# Patient Record
Sex: MALE | Race: WHITE | NOT HISPANIC OR LATINO | URBAN - METROPOLITAN AREA
[De-identification: names, ages, dates, MRNs, and addresses within clinical notes are randomized per-mention and may not be internally consistent; named-entity substitution may affect disease eponyms.]

---

## 2017-01-12 DIAGNOSIS — R06.2 WHEEZING: Primary | ICD-10-CM

## 2017-01-12 DIAGNOSIS — E84.0 CYSTIC FIBROSIS WITH PULMONARY MANIFESTATIONS (H): ICD-10-CM

## 2017-01-12 RX ORDER — ALBUTEROL SULFATE 90 UG/1
2 AEROSOL, METERED RESPIRATORY (INHALATION) EVERY 6 HOURS PRN
Qty: 1 INHALER | Refills: 12 | Status: SHIPPED | OUTPATIENT
Start: 2017-01-12 | End: 2017-11-28

## 2017-03-23 DIAGNOSIS — E84.9 CF (CYSTIC FIBROSIS) (H): Primary | ICD-10-CM

## 2017-03-24 ENCOUNTER — TELEPHONE (OUTPATIENT)
Dept: PULMONOLOGY | Facility: CLINIC | Age: 37
End: 2017-03-24

## 2017-03-24 NOTE — TELEPHONE ENCOUNTER
Prior Authorization Retail Medication Request  Medication/Dose: Symbicort 80-4.5MCG/ACT  Diagnosis and ICD code: E84.0  New/Renewal/Insurance Change PA: renewal  Previously Tried and Failed Therapies:     Insurance ID (if provided): [id number]  Insurance Phone (if provided): [employer]    Covermymeds Keycode: [id number]     Any additional info from fax request:     If you received a fax notification from an outside Pharmacy:  Pharmacy Name:  Pharmacy #:  Pharmacy Fax:

## 2017-04-03 NOTE — TELEPHONE ENCOUNTER
Prior Authorization Approval    Authorization Effective Date: 3/23/2017  Authorization Expiration Date: 3/23/2018  Medication: Symbicort 80-4.5MCG/ACT--APPROVED  Approved Dose/Quantity: 3 per 90 days  Reference #:     Insurance Company: [employer] - Phone [phone] Fax [phone]  Expected CoPay:       CoPay Card Available:      Foundation Assistance Needed:    Which Pharmacy is filling the prescription (Not needed for infusion/clinic administered): [facility] [address] [place] [address] [address]  Pharmacy Notified:  yes  Patient Notified:  Yes

## 2017-11-22 ASSESSMENT — ACTIVITIES OF DAILY LIVING (ADL)
ARE_THERE_FIREARMS_IN_YOUR_HOME?: N
ARE_THERE_CARBON_MONOXIDE_DETECTORS_IN_YOUR_HOME?: Y
DO_MEMBERS_OF_YOUR_HOUSEHOLD_WEAR_SEAT_BELTS?: Y
ARE_THERE_SMOKE_DETECTORS_IN_YOUR_HOME?: Y
DO_MEMBERS_OF_YOUR_HOUSEHOLD_USE_SUNSCREEN?: Y
DO_MEMBERS_OF_YOUR_HOUSEHOLD_USE_SAFETY_HELMETS?: Y

## 2017-11-28 ENCOUNTER — INFUSION THERAPY VISIT (OUTPATIENT)
Dept: INFUSION THERAPY | Facility: CLINIC | Age: 37
End: 2017-11-28
Attending: INTERNAL MEDICINE
Payer: COMMERCIAL

## 2017-11-28 ENCOUNTER — OFFICE VISIT (OUTPATIENT)
Dept: PULMONOLOGY | Facility: CLINIC | Age: 37
End: 2017-11-28
Attending: INTERNAL MEDICINE
Payer: COMMERCIAL

## 2017-11-28 ENCOUNTER — ALLIED HEALTH/NURSE VISIT (OUTPATIENT)
Dept: CARE COORDINATION | Facility: CLINIC | Age: 37
End: 2017-11-28

## 2017-11-28 VITALS
RESPIRATION RATE: 18 BRPM | HEIGHT: 68 IN | HEART RATE: 56 BPM | OXYGEN SATURATION: 98 % | BODY MASS INDEX: 29.4 KG/M2 | DIASTOLIC BLOOD PRESSURE: 75 MMHG | WEIGHT: 194 LBS | SYSTOLIC BLOOD PRESSURE: 127 MMHG

## 2017-11-28 VITALS
RESPIRATION RATE: 16 BRPM | HEART RATE: 62 BPM | SYSTOLIC BLOOD PRESSURE: 121 MMHG | WEIGHT: 193.12 LBS | DIASTOLIC BLOOD PRESSURE: 64 MMHG | TEMPERATURE: 98 F | BODY MASS INDEX: 29.36 KG/M2

## 2017-11-28 DIAGNOSIS — J45.909 ASTHMA: ICD-10-CM

## 2017-11-28 DIAGNOSIS — R06.2 WHEEZING: ICD-10-CM

## 2017-11-28 DIAGNOSIS — E84.0 CYSTIC FIBROSIS WITH PULMONARY MANIFESTATIONS (H): ICD-10-CM

## 2017-11-28 DIAGNOSIS — E84.9 CF (CYSTIC FIBROSIS) (H): ICD-10-CM

## 2017-11-28 DIAGNOSIS — E84.9 CF (CYSTIC FIBROSIS) (H): Primary | ICD-10-CM

## 2017-11-28 DIAGNOSIS — Z13.9 RISK AND FUNCTIONAL ASSESSMENT: Primary | ICD-10-CM

## 2017-11-28 DIAGNOSIS — E84.0 CYSTIC FIBROSIS WITH PULMONARY MANIFESTATIONS (H): Primary | ICD-10-CM

## 2017-11-28 LAB
ALBUMIN SERPL-MCNC: 4 G/DL (ref 3.4–5)
ALBUMIN UR-MCNC: NEGATIVE MG/DL
ALP SERPL-CCNC: 64 U/L (ref 40–150)
ANION GAP SERPL CALCULATED.3IONS-SCNC: 8 MMOL/L (ref 3–14)
APPEARANCE UR: CLEAR
AST SERPL W P-5'-P-CCNC: 24 U/L (ref 0–45)
BASOPHILS # BLD AUTO: 0 10E9/L (ref 0–0.2)
BASOPHILS NFR BLD AUTO: 0.7 %
BILIRUB UR QL STRIP: NEGATIVE
BUN SERPL-MCNC: 14 MG/DL (ref 7–30)
CALCIUM SERPL-MCNC: 8.9 MG/DL (ref 8.5–10.1)
CHLORIDE SERPL-SCNC: 106 MMOL/L (ref 94–109)
CHOLEST SERPL-MCNC: 190 MG/DL
CO2 SERPL-SCNC: 25 MMOL/L (ref 20–32)
COLOR UR AUTO: YELLOW
CREAT SERPL-MCNC: 0.87 MG/DL (ref 0.66–1.25)
CREAT UR-MCNC: 129 MG/DL
DEPRECATED CALCIDIOL+CALCIFEROL SERPL-MC: 18 UG/L (ref 20–75)
DIFFERENTIAL METHOD BLD: NORMAL
EOSINOPHIL # BLD AUTO: 0.2 10E9/L (ref 0–0.7)
EOSINOPHIL NFR BLD AUTO: 2.9 %
ERYTHROCYTE [DISTWIDTH] IN BLOOD BY AUTOMATED COUNT: 12.1 % (ref 10–15)
ERYTHROCYTE [SEDIMENTATION RATE] IN BLOOD BY WESTERGREN METHOD: 4 MM/H (ref 0–15)
EXPTIME-PRE: 7.48 SEC
FEF2575-%PRED-PRE: 71 %
FEF2575-PRE: 2.96 L/SEC
FEF2575-PRED: 4.15 L/SEC
FEFMAX-%PRED-PRE: 108 %
FEFMAX-PRE: 10.72 L/SEC
FEFMAX-PRED: 9.85 L/SEC
FEV1-%PRED-PRE: 88 %
FEV1-PRE: 3.64 L
FEV1FEV6-PRE: 78 %
FEV1FEV6-PRED: 83 %
FEV1FVC-PRE: 76 %
FEV1FVC-PRED: 82 %
FIFMAX-PRE: 8.62 L/SEC
FVC-%PRED-PRE: 95 %
FVC-PRE: 4.8 L
FVC-PRED: 5.03 L
GFR SERPL CREATININE-BSD FRML MDRD: >90 ML/MIN/1.7M2
GGT SERPL-CCNC: 18 U/L (ref 0–75)
GLUCOSE BLDC GLUCOMTR-MCNC: 95 MG/DL (ref 70–99)
GLUCOSE SERPL-MCNC: 112 MG/DL (ref 70–99)
GLUCOSE SERPL-MCNC: 140 MG/DL (ref 70–99)
GLUCOSE SERPL-MCNC: 86 MG/DL (ref 70–99)
GLUCOSE SERPL-MCNC: 88 MG/DL (ref 70–99)
GLUCOSE SERPL-MCNC: 95 MG/DL (ref 70–99)
GLUCOSE SERPL-MCNC: 97 MG/DL (ref 70–99)
GLUCOSE UR STRIP-MCNC: NEGATIVE MG/DL
HBA1C MFR BLD: 4.9 % (ref 4.3–6)
HCT VFR BLD AUTO: 44.1 % (ref 40–53)
HDLC SERPL-MCNC: 53 MG/DL
HGB BLD-MCNC: 15.2 G/DL (ref 13.3–17.7)
HGB UR QL STRIP: NEGATIVE
IGA SERPL-MCNC: 274 MG/DL (ref 70–380)
IGG SERPL-MCNC: 912 MG/DL (ref 695–1620)
IGM SERPL-MCNC: 53 MG/DL (ref 60–265)
IMM GRANULOCYTES # BLD: 0 10E9/L (ref 0–0.4)
IMM GRANULOCYTES NFR BLD: 0.2 %
INR PPP: 1.12 (ref 0.86–1.14)
INSULIN SERPL-ACNC: 20.4 MU/L (ref 3–25)
INSULIN SERPL-ACNC: 20.7 MU/L (ref 3–25)
INSULIN SERPL-ACNC: 24.6 MU/L (ref 3–25)
INSULIN SERPL-ACNC: 34.9 MU/L (ref 3–25)
INSULIN SERPL-ACNC: 5.6 MU/L (ref 3–25)
IRON SERPL-MCNC: 49 UG/DL (ref 35–180)
KETONES UR STRIP-MCNC: NEGATIVE MG/DL
LDLC SERPL CALC-MCNC: 107 MG/DL
LEUKOCYTE ESTERASE UR QL STRIP: NEGATIVE
LYMPHOCYTES # BLD AUTO: 2.6 10E9/L (ref 0.8–5.3)
LYMPHOCYTES NFR BLD AUTO: 45.8 %
MAGNESIUM SERPL-MCNC: 2 MG/DL (ref 1.6–2.3)
MCH RBC QN AUTO: 30.6 PG (ref 26.5–33)
MCHC RBC AUTO-ENTMCNC: 34.5 G/DL (ref 31.5–36.5)
MCV RBC AUTO: 89 FL (ref 78–100)
MICROALBUMIN UR-MCNC: <5 MG/L
MICROALBUMIN/CREAT UR: NORMAL MG/G CR (ref 0–17)
MONOCYTES # BLD AUTO: 0.4 10E9/L (ref 0–1.3)
MONOCYTES NFR BLD AUTO: 7.3 %
MUCOUS THREADS #/AREA URNS LPF: PRESENT /LPF
NEUTROPHILS # BLD AUTO: 2.4 10E9/L (ref 1.6–8.3)
NEUTROPHILS NFR BLD AUTO: 43.1 %
NITRATE UR QL: NEGATIVE
NONHDLC SERPL-MCNC: 136 MG/DL
NRBC # BLD AUTO: 0 10*3/UL
NRBC BLD AUTO-RTO: 0 /100
PH UR STRIP: 5 PH (ref 5–7)
PHOSPHATE SERPL-MCNC: 3 MG/DL (ref 2.5–4.5)
PLATELET # BLD AUTO: 238 10E9/L (ref 150–450)
POTASSIUM SERPL-SCNC: 3.8 MMOL/L (ref 3.4–5.3)
PROT SERPL-MCNC: 7.1 G/DL (ref 6.8–8.8)
RBC # BLD AUTO: 4.97 10E12/L (ref 4.4–5.9)
RBC #/AREA URNS AUTO: 0 /HPF (ref 0–2)
SODIUM SERPL-SCNC: 140 MMOL/L (ref 133–144)
SOURCE: ABNORMAL
SP GR UR STRIP: 1.01 (ref 1–1.03)
TRIGL SERPL-MCNC: 144 MG/DL
TSH SERPL DL<=0.005 MIU/L-ACNC: 1.82 MU/L (ref 0.4–4)
UROBILINOGEN UR STRIP-MCNC: 0 MG/DL (ref 0–2)
WBC # BLD AUTO: 5.6 10E9/L (ref 4–11)
WBC #/AREA URNS AUTO: 1 /HPF (ref 0–2)

## 2017-11-28 PROCEDURE — 85025 COMPLETE CBC W/AUTO DIFF WBC: CPT | Performed by: INTERNAL MEDICINE

## 2017-11-28 PROCEDURE — 82784 ASSAY IGA/IGD/IGG/IGM EACH: CPT | Performed by: INTERNAL MEDICINE

## 2017-11-28 PROCEDURE — 82962 GLUCOSE BLOOD TEST: CPT

## 2017-11-28 PROCEDURE — 82947 ASSAY GLUCOSE BLOOD QUANT: CPT | Mod: 91 | Performed by: INTERNAL MEDICINE

## 2017-11-28 PROCEDURE — 82785 ASSAY OF IGE: CPT | Performed by: INTERNAL MEDICINE

## 2017-11-28 PROCEDURE — 83540 ASSAY OF IRON: CPT | Performed by: INTERNAL MEDICINE

## 2017-11-28 PROCEDURE — 84446 ASSAY OF VITAMIN E: CPT | Performed by: INTERNAL MEDICINE

## 2017-11-28 PROCEDURE — 84450 TRANSFERASE (AST) (SGOT): CPT | Performed by: INTERNAL MEDICINE

## 2017-11-28 PROCEDURE — 83735 ASSAY OF MAGNESIUM: CPT | Performed by: INTERNAL MEDICINE

## 2017-11-28 PROCEDURE — 84443 ASSAY THYROID STIM HORMONE: CPT | Performed by: INTERNAL MEDICINE

## 2017-11-28 PROCEDURE — 87070 CULTURE OTHR SPECIMN AEROBIC: CPT | Performed by: INTERNAL MEDICINE

## 2017-11-28 PROCEDURE — 84403 ASSAY OF TOTAL TESTOSTERONE: CPT | Performed by: INTERNAL MEDICINE

## 2017-11-28 PROCEDURE — 84155 ASSAY OF PROTEIN SERUM: CPT | Performed by: INTERNAL MEDICINE

## 2017-11-28 PROCEDURE — 84075 ASSAY ALKALINE PHOSPHATASE: CPT | Performed by: INTERNAL MEDICINE

## 2017-11-28 PROCEDURE — 85610 PROTHROMBIN TIME: CPT | Performed by: INTERNAL MEDICINE

## 2017-11-28 PROCEDURE — 82306 VITAMIN D 25 HYDROXY: CPT | Performed by: INTERNAL MEDICINE

## 2017-11-28 PROCEDURE — 80069 RENAL FUNCTION PANEL: CPT | Performed by: INTERNAL MEDICINE

## 2017-11-28 PROCEDURE — 83036 HEMOGLOBIN GLYCOSYLATED A1C: CPT | Performed by: INTERNAL MEDICINE

## 2017-11-28 PROCEDURE — 82043 UR ALBUMIN QUANTITATIVE: CPT | Performed by: INTERNAL MEDICINE

## 2017-11-28 PROCEDURE — 83525 ASSAY OF INSULIN: CPT | Performed by: INTERNAL MEDICINE

## 2017-11-28 PROCEDURE — 81001 URINALYSIS AUTO W/SCOPE: CPT | Performed by: INTERNAL MEDICINE

## 2017-11-28 PROCEDURE — 99212 OFFICE O/P EST SF 10 MIN: CPT | Mod: ZF

## 2017-11-28 PROCEDURE — 25000125 ZZHC RX 250: Mod: ZF | Performed by: INTERNAL MEDICINE

## 2017-11-28 PROCEDURE — 97803 MED NUTRITION INDIV SUBSEQ: CPT | Mod: 59

## 2017-11-28 PROCEDURE — 84590 ASSAY OF VITAMIN A: CPT | Performed by: INTERNAL MEDICINE

## 2017-11-28 PROCEDURE — 80061 LIPID PANEL: CPT | Performed by: INTERNAL MEDICINE

## 2017-11-28 PROCEDURE — 85652 RBC SED RATE AUTOMATED: CPT | Performed by: INTERNAL MEDICINE

## 2017-11-28 PROCEDURE — 82977 ASSAY OF GGT: CPT | Performed by: INTERNAL MEDICINE

## 2017-11-28 RX ORDER — ALBUTEROL SULFATE 90 UG/1
2 AEROSOL, METERED RESPIRATORY (INHALATION) EVERY 6 HOURS PRN
Qty: 1 INHALER | Refills: 11 | Status: SHIPPED | OUTPATIENT
Start: 2017-11-28 | End: 2019-03-12

## 2017-11-28 RX ORDER — BUDESONIDE AND FORMOTEROL FUMARATE DIHYDRATE 80; 4.5 UG/1; UG/1
2 AEROSOL RESPIRATORY (INHALATION) 2 TIMES DAILY
COMMUNITY
End: 2017-11-28

## 2017-11-28 RX ORDER — BUDESONIDE AND FORMOTEROL FUMARATE DIHYDRATE 80; 4.5 UG/1; UG/1
2 AEROSOL RESPIRATORY (INHALATION) 2 TIMES DAILY
Qty: 1 INHALER | Refills: 11 | Status: SHIPPED | OUTPATIENT
Start: 2017-11-28 | End: 2018-04-23

## 2017-11-28 RX ADMIN — ALCOHOL 75 G: 65 GEL TOPICAL at 07:29

## 2017-11-28 ASSESSMENT — ENCOUNTER SYMPTOMS
SPEECH CHANGE: 0
PARALYSIS: 0
CLAUDICATION: 0
DIFFICULTY URINATING: 0
SINUS CONGESTION: 0
ARTHRALGIAS: 0
WHEEZING: 0
EYE PAIN: 0
NUMBNESS: 0
DYSPNEA ON EXERTION: 0
DIZZINESS: 0
TROUBLE SWALLOWING: 0
ABDOMINAL PAIN: 0
DEPRESSION: 0
COUGH DISTURBING SLEEP: 0
LOSS OF CONSCIOUSNESS: 0
SLEEP DISTURBANCES DUE TO BREATHING: 0
SHORTNESS OF BREATH: 0
MUSCLE WEAKNESS: 0
TREMORS: 0
HYPOTENSION: 0
RECTAL PAIN: 0
DECREASED APPETITE: 0
JOINT SWELLING: 0
TASTE DISTURBANCE: 0
EXTREMITY NUMBNESS: 0
HEARTBURN: 0
LIGHT-HEADEDNESS: 0
HOARSE VOICE: 0
ORTHOPNEA: 0
HALLUCINATIONS: 0
HEADACHES: 0
SINUS PAIN: 0
DECREASED CONCENTRATION: 0
STIFFNESS: 0
EXERCISE INTOLERANCE: 0
PANIC: 0
MYALGIAS: 0
VOMITING: 0
FLANK PAIN: 0
LEG PAIN: 0
ALTERED TEMPERATURE REGULATION: 0
POOR WOUND HEALING: 0
SYNCOPE: 0
NIGHT SWEATS: 0
EYE IRRITATION: 0
DOUBLE VISION: 0
LEG SWELLING: 0
NECK PAIN: 0
POLYDIPSIA: 0
RESPIRATORY PAIN: 0
BLOOD IN STOOL: 0
BOWEL INCONTINENCE: 0
SWOLLEN GLANDS: 0
DYSURIA: 0
HEMOPTYSIS: 0
TINGLING: 0
BLOATING: 0
INSOMNIA: 0
SNORES LOUDLY: 0
SORE THROAT: 0
COUGH: 0
BRUISES/BLEEDS EASILY: 0
WEIGHT LOSS: 0
DISTURBANCES IN COORDINATION: 0
SMELL DISTURBANCE: 0
NECK MASS: 0
POSTURAL DYSPNEA: 0
TACHYCARDIA: 0
RECTAL BLEEDING: 0
HEMATURIA: 0
FATIGUE: 0
WEAKNESS: 0
MUSCLE CRAMPS: 0
CONSTIPATION: 0
PALPITATIONS: 0
NAUSEA: 0
SPUTUM PRODUCTION: 0
EYE REDNESS: 0
EYE WATERING: 0
INCREASED ENERGY: 0
DIARRHEA: 0
JAUNDICE: 0
SKIN CHANGES: 0
WEIGHT GAIN: 0
NERVOUS/ANXIOUS: 0
SEIZURES: 0
HYPERTENSION: 0
BACK PAIN: 0
NAIL CHANGES: 0
POLYPHAGIA: 0
CHILLS: 0
MEMORY LOSS: 0
FEVER: 0

## 2017-11-28 ASSESSMENT — PAIN SCALES - GENERAL: PAINLEVEL: NO PAIN (0)

## 2017-11-28 NOTE — PATIENT INSTRUCTIONS
Cystic Fibrosis Self-Care Plan       MRN: [id number]   Clinic Date: November 28, 2017   Patient: Mitesh Tovar     Annual Studies:   IGG   Date Value Ref Range Status   11/28/2017 912 695 - 1620 mg/dL Final     Insulin   Date Value Ref Range Status   11/28/2017 20.4 3 - 25 mU/L Final     Comment:     Starting 7/13/2017, insulin results will decrease by approximately 37%   compared to insulin results reported in EPIC between (12/16/2016-7/12/2017),   and should be interpreted accordingly. Insulin results reported prior to   12/16/16 are comparable to current insulin results and therefore no adjustment   is needed.       There are no preventive care reminders to display for this patient.      Pulmonary Function Tests  FEV1: amount of air you can blow out in 1 second  FVC: total amount of air you can take in and blow out    Your Goals:         PFT Latest Ref Rng & Units 11/28/2017   FVC L 4.80   FEV1 L 3.64   FVC% % 95   FEV1% % 88          Airway Clearance: The Most Important Way to Keep Your Lungs Healthy  Vest Settings:    Hill-Rom Frequencies: 8, 9, 10 Pressure 10 Then, Frequencies 18, 19, 20 Pressure 6      RespirTech: Quick Start with Pressure of     Do each frequency for 5 minutes; Deflate vest after each frequency & cough 3 times before beginning the next setting.    Inhaler Therapy should be done 2 times/day.    Good Nutrition Can Improve Lung Function and Overall Health     Take ALL of your vitamins with food     Take 1/2 of your enzymes before EVERY meal/snack and the other 1/2 mid-meal/snack    Wt Readings from Last 3 Encounters:   11/28/17 88 kg (194 lb 0.1 oz)   11/28/17 87.6 kg (193 lb 2 oz)   11/22/16 88.5 kg (195 lb 1.7 oz)       Body mass index is 29.5 kg/(m^2).    National CF Foundation Recommendations for BMI in CF Adults: Women: at least 22 Men: at least 23      Controlling Blood Sugars Helps Prevent Lung Infections & Improves Nutrition  Test blood sugar:     In the morning before eating  (goal is )     2 hours after a meal (goal is less than 150)     When pre-meal glucose is greater than 150 add correction     At bedtime (if less than 100 eat a snack with 15 grams of carbohydrates  Last A1C Results:   Hemoglobin A1C   Date Value Ref Range Status   11/28/2017 4.9 4.3 - 6.0 % Final         If diabetic, measure A1C every 6 months. Goal: Under 7%    Staying Healthy    Research:  If you are interested in learning about research opportunities or have questions, please contact Stephanie Sinha at [phone] or [email or web address].      CF Foundation:  Compass is a personalized resource service to help you with the insurance, financial, legal and other issues you are facing.  It's free, confidential and available to anyone with CF.  Ask your  for more information or contact Compass directly at [facility] ([phone]) or compass@cff.org, or learn more at cff.[email or web address].          RECOMMENDATIONS: Continue to use your inhaler therapy.  You can try decreasing your symbicort to once daily.  Read about ivacaftor on the Cystic Fibrosis Foundation website.  Leta will talk about vitamins.    YOUR GOAL: Enjoy the Holidays!      CF Nurse Line:  Nkiky Recinos: [phone]   Nohemy Colmenares, RT: [phone]     Zee Burns: [phone] or Leta Escalona, Dieticians: [phone]   Betty Paz, Diabetes Nurse: [phone]      Monika Wasserman: [phone] or Elsa Marshall [phone], Social Workers   [email or web address]

## 2017-11-28 NOTE — MR AVS SNAPSHOT
After Visit Summary   11/28/2017    Mitesh Tovar    MRN: [id number]           Patient Information     Date Of Birth          1980        Visit Information        Provider Department      11/28/2017 11:20 AM Carole Ward MD [facility]        Today's Diagnoses     CF (cystic fibrosis) (H)    -  1    Wheezing        Cystic fibrosis with pulmonary manifestations (H)        Cystic fibrosis with pulmonary manifestations          Care Instructions    Cystic Fibrosis Self-Care Plan       MRN: [id number]   Clinic Date: November 28, 2017   Patient: Mitesh Tovar     Annual Studies:   IGG   Date Value Ref Range Status   11/28/2017 912 695 - 1620 mg/dL Final     Insulin   Date Value Ref Range Status   11/28/2017 20.4 3 - 25 mU/L Final     Comment:     Starting 7/13/2017, insulin results will decrease by approximately 37%   compared to insulin results reported in EPIC between (12/16/2016-7/12/2017),   and should be interpreted accordingly. Insulin results reported prior to   12/16/16 are comparable to current insulin results and therefore no adjustment   is needed.       There are no preventive care reminders to display for this patient.      Pulmonary Function Tests  FEV1: amount of air you can blow out in 1 second  FVC: total amount of air you can take in and blow out    Your Goals:         PFT Latest Ref Rng & Units 11/28/2017   FVC L 4.80   FEV1 L 3.64   FVC% % 95   FEV1% % 88          Airway Clearance: The Most Important Way to Keep Your Lungs Healthy  Vest Settings:    Hill-Rom Frequencies: 8, 9, 10 Pressure 10 Then, Frequencies 18, 19, 20 Pressure 6      RespirTech: Quick Start with Pressure of     Do each frequency for 5 minutes; Deflate vest after each frequency & cough 3 times before beginning the next setting.    Inhaler Therapy should be done 2 times/day.    Good Nutrition Can Improve Lung Function and Overall Health     Take ALL of your  vitamins with food     Take 1/2 of your enzymes before EVERY meal/snack and the other 1/2 mid-meal/snack    Wt Readings from Last 3 Encounters:   11/28/17 88 kg (194 lb 0.1 oz)   11/28/17 87.6 kg (193 lb 2 oz)   11/22/16 88.5 kg (195 lb 1.7 oz)       Body mass index is 29.5 kg/(m^2).    National CF Foundation Recommendations for BMI in CF Adults: Women: at least 22 Men: at least 23      Controlling Blood Sugars Helps Prevent Lung Infections & Improves Nutrition  Test blood sugar:     In the morning before eating (goal is )     2 hours after a meal (goal is less than 150)     When pre-meal glucose is greater than 150 add correction     At bedtime (if less than 100 eat a snack with 15 grams of carbohydrates  Last A1C Results:   Hemoglobin A1C   Date Value Ref Range Status   11/28/2017 4.9 4.3 - 6.0 % Final         If diabetic, measure A1C every 6 months. Goal: Under 7%    Staying Healthy    Research:  If you are interested in learning about research opportunities or have questions, please contact Stephanie Sinha at [phone] or [email or web address].      CF Foundation:  Compass is a personalized resource service to help you with the insurance, financial, legal and other issues you are facing.  It's free, confidential and available to anyone with CF.  Ask your  for more information or contact Compass directly at [facility] ([phone]) or compass@[email or web address], or learn more at cff.org/compass.          RECOMMENDATIONS: Continue to use your inhaler therapy.  You can try decreasing your symbicort to once daily.  Read about ivacaftor on the Cystic Fibrosis Foundation website.  Leta will talk about vitamins.    YOUR GOAL: Enjoy the Holidays!      CF Nurse Line:  Nikky Recinos: [phone]   Nohemy Colmenares, RT: [phone]     Zee Burns: [phone] or Leta Escalona, Mishelicians: [phone]   Betty Paz, Diabetes Nurse: [phone]      Monika Wasserman: [phone] or Elsa Marshall  [phone], Social Workers   [email or web address]                   Follow-ups after your visit        Follow-up notes from your care team     Return in about 1 year (around 11/28/2018).      Your next 10 appointments already scheduled     Nov 27, 2018  7:30 AM CST   CF LOOP with UC PFL CF   [facility] Pulmonary Function Testing ([facility])    [address] [address] [phone]            Nov 27, 2018  8:00 AM CST   (Arrive by 7:45 AM)   RETURN CYSTIC FIBROSIS VISIT with Carole Ward MD   [facility] Lung Science and Health ([facility])    [address] [address] [phone]              Future tests that were ordered for you today     Open Standing Orders        Priority Remaining Interval Expires Ordered    Cystic Fibrosis Culture Aerob Bacterial Routine 99/100  11/28/2018 11/28/2017    AFB Culture Non Blood Routine 20/20 11/28/2018 11/28/2017    AFB Stain Non Blood Routine 20/20 11/28/2018 11/28/2017            Who to contact     If you have questions or need follow up information about today's clinic visit or your schedule please contact [facility] LUNG SCIENCE AND HEALTH directly at [phone].  Normal or non-critical lab and imaging results will be communicated to you by MyChart, letter or phone within 4 business days after the clinic has received the results. If you do not hear from us within 7 days, please contact the clinic through MyChart or phone. If you have a critical or abnormal lab result, we will notify you by phone as soon as possible.  Submit refill requests through [employer] or call your pharmacy and they will forward the refill request to us. Please allow 3 business days for your refill to be completed.          Additional Information About Your Visit        [employer] Information     [employer] gives you secure access to your electronic health  "record. If you see a primary care provider, you can also send messages to your care team and make appointments. If you have questions, please call your primary care clinic.  If you do not have a primary care provider, please call [phone] and they will assist you.        Care EveryWhere ID     This is your Care EveryWhere ID. This could be used by other organizations to access your [place] medical records  [id number]        Your Vitals Were     Pulse Respirations Height Pulse Oximetry BMI (Body Mass Index)       56 18 1.727 m (5' 8\") 98% 29.5 kg/m2        Blood Pressure from Last 3 Encounters:   11/28/17 127/75   11/28/17 121/64   11/22/16 125/78    Weight from Last 3 Encounters:   11/28/17 88 kg (194 lb 0.1 oz)   11/28/17 87.6 kg (193 lb 2 oz)   11/22/16 88.5 kg (195 lb 1.7 oz)              We Performed the Following     Cystic Fibrosis Culture Aerob Bacterial          Where to get your medicines      These medications were sent to [facility] [address] [place], [place] - [address]  [address], [place] [address]    Hours:  test fax sent successfully 1/20/04   Phone:  [phone]     albuterol 108 (90 BASE) MCG/ACT Inhaler    budesonide-formoterol 80-4.5 MCG/ACT Inhaler          Primary Care Provider Office Phone # Fax #    Boris CHRISTIN Shabazz [phone]       [address] [address]        Equal Access to Services     [facility] Center[facility]ER[facility] AH: Hadii tyler Alcantara, dhaval quispe, qaybgerald lozoya. So [facility] [phone].    ATENCIÓN: Si habla español, tiene a galvez disposición servicios gratuitos de asistencia lingüística. Analisa al [phone].    We comply with applicable federal civil rights laws and [place] laws. We do not discriminate on the basis of race, color, national origin, age, disability, sex, sexual orientation, or gender identity.          "   Thank you!     Thank you for choosing [facility] FOR LUNG SCIENCE AND HEALTH  for your care. Our goal is always to provide you with excellent care. Hearing back from our patients is one way we can continue to improve our services. Please take a few minutes to complete the written survey that you may receive in the mail after your visit with us. Thank you!             Your Updated Medication List - Protect others around you: Learn how to safely use, store and throw away your medicines at www.disposemymeds.org.          This list is accurate as of: 11/28/17  6:52 PM.  Always use your most recent med list.                   Brand Name Dispense Instructions for use Diagnosis    albuterol 108 (90 BASE) MCG/ACT Inhaler    PROAIR HFA/PROVENTIL HFA/VENTOLIN HFA    1 Inhaler    Inhale 2 puffs into the lungs every 6 hours as needed for shortness of breath / dyspnea or wheezing    Wheezing, Cystic fibrosis with pulmonary manifestations (H), Cystic fibrosis with pulmonary manifestations (H)       budesonide-formoterol 80-4.5 MCG/ACT Inhaler    SYMBICORT    1 Inhaler    Inhale 2 puffs into the lungs 2 times daily    CF (cystic fibrosis) (H)

## 2017-11-28 NOTE — MR AVS SNAPSHOT
After Visit Summary   11/28/2017    Mitesh Tovar    MRN: [id number]           Patient Information     Date Of Birth          1980        Visit Information        Provider Department      11/28/2017 7:00 AM UC [facility] ATC; UC SPEC INFUSION [facility] Specialty and Procedure        Today's Diagnoses     CF (cystic fibrosis) (H)    -  1    Cystic fibrosis with pulmonary manifestations        Wheezing        Cystic fibrosis with pulmonary manifestations (H)        Asthma          Care Instructions    Dear Mitesh Tovar    Thank you for choosing [facility] ([facility]) for your OGTT.  The following information is a summary of our appointment as well as important reminders.          We look forward in seeing you on your next appointment here at [facility].  Please don t hesitate to call us at [phone] to reschedule any of your appointments or to speak with one of the [facility] registered nurses.  It was a pleasure taking care of you today.    Sincerely,    [facility]  Specialty Infusion & Procedure Center  [address] [place] [address]  [address]  Phone:  [phone]                Follow-ups after your visit        Your next 10 appointments already scheduled     Nov 28, 2017 11:00 AM CST   CF LOOP with  PFL CF   [facility] Pulmonary Function Testing ([facility])    [address] [place] [address] [phone]            Nov 28, 2017 11:20 AM CST   (Arrive by 11:05 AM)   RETURN CYSTIC FIBROSIS VISIT with Carole Ward MD   [facility] for Lung Science and Health ([facility])    [address] [place] [address] [phone]              Who to contact     If you have questions or need follow up information about today's clinic visit or your schedule please contact [facility] SPECIALTY AND PROCEDURE directly at [phone].  Normal or non-critical lab and imaging results will be communicated to you by MyChart, letter or phone within 4 business days after the clinic has received the results. If you do not hear from us within 7 days, please contact the clinic through [employer] or phone. If you have a critical or abnormal lab result, we will notify you by phone as soon as possible.  Submit refill requests through [employer] or call your pharmacy and they will forward the refill request to us. Please allow 3 business days for your refill to be completed.          Additional Information About Your Visit        [employer] Information     [employer] gives you secure access to your electronic health record. If you see a primary care provider, you can also send messages to your care team and make appointments. If you have questions, please call your primary care clinic.  If you do not have a primary care provider, please call [phone] and they will assist you.        Care EveryWhere ID     This is your Care EveryWhere ID. This could be used by other organizations to access your [place] medical records  [id number]        Your Vitals Were     Pulse Temperature Respirations BMI (Body Mass Index)          62 98  F (36.7  C) 16 29.36 kg/m2         Blood Pressure from Last 3 Encounters:   11/28/17 121/64   11/22/16 125/78   11/17/15 124/75    Weight from Last 3 Encounters:   11/28/17 87.6 kg (193 lb 2 oz)   11/22/16 88.5 kg (195 lb 1.7 oz)   11/17/15 89.7 kg (197 lb 12 oz)              We Performed the Following     Albumin level     Alkaline phosphatase     AST     Basic metabolic panel     CBC with platelets differential     Erythrocyte sedimentation rate auto     GGT     Glucose by meter     Glucose in a Series: Draw +120 minutes     Glucose in a Series: Draw +30 minutes     Glucose in a Series: Draw +60 minutes     Glucose in a Series: Draw +90 minutes     Glucose in a  Series: Draw Time Zero     Hemoglobin A1c     IgA     IgE     IgG     IgM     INR     Insulin in a Series: Draw +120 minutes     Insulin in a Series: Draw +30 minutes     Insulin in a Series: Draw +60 minutes     Insulin in a Series: Draw +90 minutes     Insulin in a Series: Draw Time Zero     Iron     Lipid Profile     Magnesium     Microalbumin quantitative random urine     Phosphorus     Protein total     Routine UA with microscopic     Testosterone total      TSH with free T4 reflex     Vitamin A     Vitamin D Deficiency     Vitamin E        Primary Care Provider Office Phone # Fax #    Boris Shabazz [phone]       [facility] [address] [facility] [address]        Equal Access to Services     [facility] CenterJEROME : Hadii aad ku hadasho Soomaali, waaxda luqadaha, qaybta kaalmada adesanjayyalily, gerald khan . So [facility] [phone].    ATENCIÓN: Si habla español, tiene a galvez disposición servicios gratuitos de asistencia lingüística. [facility] [phone].    We comply with applicable federal civil rights laws and [place] laws. We do not discriminate on the basis of race, color, national origin, age, disability, sex, sexual orientation, or gender identity.            Thank you!     Thank you for choosing [facility] SPECIALTY AND PROCEDURE  for your care. Our goal is always to provide you with excellent care. Hearing back from our patients is one way we can continue to improve our services. Please take a few minutes to complete the written survey that you may receive in the mail after your visit with us. Thank you!             Your Updated Medication List - Protect others around you: Learn how to safely use, store and throw away your medicines at www.disposemymeds.org.          This list is accurate as of: 11/28/17 10:32 AM.  Always use your most recent med list.                   Brand Name Dispense Instructions for use  Diagnosis    albuterol 108 (90 BASE) MCG/ACT Inhaler    PROAIR HFA/PROVENTIL HFA/VENTOLIN HFA    1 Inhaler    Inhale 2 puffs into the lungs every 6 hours as needed for shortness of breath / dyspnea or wheezing    Wheezing, Cystic fibrosis with pulmonary manifestations (H), Cystic fibrosis with pulmonary manifestations (H)       budesonide-formoterol 80-4.5 MCG/ACT Inhaler    SYMBICORT     Inhale 2 puffs into the lungs 2 times daily

## 2017-11-28 NOTE — PATIENT INSTRUCTIONS
Dear Mitesh Tovar    Thank you for choosing [facility] Specialty Infusion and Procedure Center ([facility]) for your OGTT.  The following information is a summary of our appointment as well as important reminders.          We look forward in seeing you on your next appointment here at [facility].  Please don t hesitate to call us at [phone] to reschedule any of your appointments or to speak with one of the [facility] registered nurses.  It was a pleasure taking care of you today.    Sincerely,    [facility]  Specialty Infusion & Procedure Center  [address] [place] [address]  [address]  Phone:  [phone]

## 2017-11-28 NOTE — PROGRESS NOTES
[facility]  November 28, 2017         Assessment and Plan:   Mitesh Tovar is a 36 year old male with cystic fibrosis.    1. CF lung disease with normal spirometry:  The majority of Mitesh's symptoms remain consistent with more of a bronchospasm.  He does continue to use Symbicort b.i.d. as well as an albuterol inhaler prior to exercise.  He says that this has given him excellent disease stability.  He has had no exacerbations or any concerns during the last year's time.  Today he did have a chest x-ray that was reviewed by me and Radiology which showed a normal-appearing chest.  His last sputum culture grew out only normal ariel.  I do await today's results.  Today we discussed continuing on the albuterol as needed for exercise.  He certainly could consider backing off on his Symbicort if he is overall feeling that his symptoms are stable.  He can certainly go to once daily or try to come off of it completely.  If he does not tolerate it, we discussed reinitiating the therapy.  We talked about long-term side effects of the use of these inhaler therapies, and I do think his risk is minimal at this time.    2.  CFTR modulator therapy.  Mitesh and I once again discussed the use of ivacaftor.  Given his overall disease stability and normal chest x-ray, we have chosen again not to initiate this therapy.  We also talked about new therapies that certainly may be available for patients with CF in the future including triple combination therapy.   3.  Health care maintenance.  Mitesh declined flu shot today.  He did obtain annual studies which were reviewed by me with him.  He did have a low vitamin D level.  I think it would be best if he initiated vitamin D supplementation.  Additionally, he did have a mildly elevated LDL and certainly could address this as well.    4.  Low bone mineral density.  Mitesh did show evidence of bone mineral density below the mean.  I  have again recommended that initiation of vitamin D would be appropriate.   5.  Psychosocial.  Mitesh is .  He continues to work as an .  He reports that things are going quite well.  He did run the [place] in 4 hours 28 minutes.   6. Pancreatic sufficiency:  The patient has no new symptoms consistent with worsening malabsorption.  The plan is to initiate vitamin supplementation.    Carole Ward MD MPH   of Medicine  Pulmonary, Allergy, Critical Care and Sleep Medicine      Interval History:     Mitesh denies any cough or sputum production.  He is only using inhaler therapy.  There is clearly no indication for other forms of bronchial drainage therapy at this time.  He has no shortness of breath.          Review of Systems:     All questionnaires were reviewed by me today with the patient.  Review of Systems     Constitutional:  Negative for fever, chills, weight loss, weight gain, fatigue, decreased appetite, night sweats, recent stressors, height gain, height loss, post-operative complications, incisional pain, hallucinations, increased energy, hyperactivity and confused.   HENT:  Negative for ear pain, hearing loss, tinnitus, nosebleeds, trouble swallowing, hoarse voice, mouth sores, sore throat, ear discharge, tooth pain, gum tenderness, taste disturbance, smell disturbance, hearing aid, bleeding gums, dry mouth, sinus pain, sinus congestion and neck mass.    Eyes:  Negative for double vision, pain, redness, eye pain, decreased vision, eye watering, eye bulging, eye dryness, flashing lights, spots, floaters, strabismus, tunnel vision, jaundice and eye irritation.   Respiratory:   Negative for cough, hemoptysis, sputum production, shortness of breath, wheezing, sleep disturbances due to breathing, snores loudly, respiratory pain, dyspnea on exertion, cough disturbing sleep and postural dyspnea.    Cardiovascular:  Negative for chest pain, dyspnea on exertion,  palpitations, orthopnea, claudication, leg swelling, fingers/toes turn blue, hypertension, hypotension, syncope, history of heart murmur, chest pain on exertion, chest pain at rest, pacemaker, few scattered varicosities, leg pain, sleep disturbances due to breathing, tachycardia, light-headedness, exercise intolerance and edema.   Gastrointestinal:  Negative for heartburn, nausea, vomiting, abdominal pain, diarrhea, constipation, blood in stool, melena, rectal pain, bloating, hemorrhoids, bowel incontinence, jaundice, rectal bleeding, coffee ground emesis and change in stool.   Genitourinary:  Negative for bladder incontinence, dysuria, urgency, hematuria, flank pain, difficulty urinating, nocturia, voiding less frequently, scrotal pain, ulcerations, penile discharge, male genitourinary complaint and reduced libido.   Musculoskeletal:  Negative for myalgias, back pain, joint swelling, arthralgias, stiffness, muscle cramps, neck pain, bone pain, muscle weakness and fracture.   Skin:  Negative for nail changes, itching, poor wound healing, rash, hair changes, skin changes, acne, warts, poor wound healing, scarring, flaky skin, Raynaud's phenomenon, sensitivity to sunlight and skin thickening.   Neurological:  Negative for dizziness, tingling, tremors, speech change, seizures, loss of consciousness, weakness, light-headedness, numbness, headaches, disturbances in coordination, extremity numbness, memory loss, difficulty walking and paralysis.   Endo/Heme:  Negative for anemia, swollen glands and bruises/bleeds easily.   Psychiatric/Behavioral:  Negative for depression, hallucinations, memory loss, decreased concentration, mood swings and panic attacks.    Endocrine:  Negative for altered temperature regulation, polyphagia, polydipsia, unwanted hair growth and change in facial hair.           Past Medical and Surgical History:     Past Medical History:   Diagnosis Date     Bilateral external ear infections     frequent  "as a child     Congenital absence of vas deferens      Pneumonia     x 2, during college years     Seasonal allergies      Past Surgical History:   Procedure Laterality Date     MYRINGOTOMY, INSERT TUBE BILATERAL, COMBINED  1985, 1983           Family History:     Family History   Problem Relation Age of Onset     Genitourinary Problems Brother      CBAVD, no CF testing     Hypertension Mother      Hypertension Father      DIABETES Father      CEREBROVASCULAR DISEASE Maternal Grandmother      CANCER Paternal Grandfather      type unknown            Social History:     Social History     Social History     Marital status:      Spouse name: N/A     Number of children: N/A     Years of education: N/A     Occupational History     Not on file.     Social History Main Topics     Smoking status: Never Smoker     Smokeless tobacco: Never Used     Alcohol use Not on file     Drug use: Not on file     Sexual activity: Not on file     Other Topics Concern     Not on file     Social History Narrative    Patient lives with his wife in a house in [place], [place].  He works full time as an .  He has minimal alcohol consumption.  No tobacco exposure.  He has no pets.  Training for a Duathlon (run, bike, run).            Medications:     Current Outpatient Prescriptions   Medication     budesonide-formoterol (SYMBICORT) 80-4.5 MCG/ACT Inhaler     albuterol (PROAIR HFA/PROVENTIL HFA/VENTOLIN HFA) 108 (90 BASE) MCG/ACT Inhaler     [DISCONTINUED] budesonide-formoterol (SYMBICORT) 80-4.5 MCG/ACT Inhaler     [DISCONTINUED] albuterol (PROAIR HFA/PROVENTIL HFA/VENTOLIN HFA) 108 (90 BASE) MCG/ACT Inhaler     No current facility-administered medications for this visit.             Physical Exam:   /75  Pulse 56  Resp 18  Ht 1.727 m (5' 8\")  Wt 88 kg (194 lb 0.1 oz)  SpO2 98%  BMI 29.5 kg/m2    Constitutional:   Awake, alert and in no apparent distress     Eyes:   nonicteric     ENT:   oral mucosa moist without " lesions, normal tm bilaterally, bilateral mucosal erythema     Neck:   Supple without supraclavicular or cervical lymphadenopathy     Lungs:   Good air flow.  No crackles. No rhonchi.  No wheezes.     Cardiovascular:   Normal S1 and S2.  RRR.  No murmur, gallop or rub.     Abdomen:   NABS, soft, nontender, nondistended.  No HSM.     Musculoskeletal:   No edema, no digital clubbing present     Neurologic:   Alert and conversant.     Skin:   Warm, dry.  No rash on limited exam.             Data:   All laboratory and imaging data reviewed.    Cystic Fibrosis Culture  Specimen Description   Date Value Ref Range Status   11/28/2017 Throat  Final   11/17/2015 Throat  Final   11/18/2014 Throat  Final    Culture Micro   Date Value Ref Range Status   11/28/2017 PENDING  Preliminary   11/17/2015 Normal ariel  Final   11/18/2014 Normal ariel  Light growth Serratia marcescens   (A)  Final        Recent Results (from the past 168 hour(s))   Glucose in a Series: Draw Time Zero    Collection Time: 11/28/17  7:25 AM   Result Value Ref Range    Glucose 88 70 - 99 mg/dL   Insulin in a Series: Draw Time Zero    Collection Time: 11/28/17  7:25 AM   Result Value Ref Range    Insulin 5.6 3 - 25 mU/L   Basic metabolic panel    Collection Time: 11/28/17  7:25 AM   Result Value Ref Range    Sodium 140 133 - 144 mmol/L    Potassium 3.8 3.4 - 5.3 mmol/L    Chloride 106 94 - 109 mmol/L    Carbon Dioxide 25 20 - 32 mmol/L    Anion Gap 8 3 - 14 mmol/L    Glucose 86 70 - 99 mg/dL    Urea Nitrogen 14 7 - 30 mg/dL    Creatinine 0.87 0.66 - 1.25 mg/dL    GFR Estimate >90 >60 mL/min/1.7m2    GFR Estimate If Black >90 >60 mL/min/1.7m2    Calcium 8.9 8.5 - 10.1 mg/dL   Lipid Profile    Collection Time: 11/28/17  7:25 AM   Result Value Ref Range    Cholesterol 190 <200 mg/dL    Triglycerides 144 <150 mg/dL    HDL Cholesterol 53 >39 mg/dL    LDL Cholesterol Calculated 107 (H) <100 mg/dL    Non HDL Cholesterol 136 (H) <130 mg/dL   Albumin level     Collection Time: 11/28/17  7:25 AM   Result Value Ref Range    Albumin 4.0 3.4 - 5.0 g/dL   Alkaline phosphatase    Collection Time: 11/28/17  7:25 AM   Result Value Ref Range    Alkaline Phosphatase 64 40 - 150 U/L   AST    Collection Time: 11/28/17  7:25 AM   Result Value Ref Range    AST 24 0 - 45 U/L   Iron    Collection Time: 11/28/17  7:25 AM   Result Value Ref Range    Iron 49 35 - 180 ug/dL   GGT    Collection Time: 11/28/17  7:25 AM   Result Value Ref Range    GGT 18 0 - 75 U/L   Hemoglobin A1c    Collection Time: 11/28/17  7:25 AM   Result Value Ref Range    Hemoglobin A1C 4.9 4.3 - 6.0 %   IgA    Collection Time: 11/28/17  7:25 AM   Result Value Ref Range     70 - 380 mg/dL   IgG    Collection Time: 11/28/17  7:25 AM   Result Value Ref Range     695 - 1620 mg/dL   IgM    Collection Time: 11/28/17  7:25 AM   Result Value Ref Range    IGM 53 (L) 60 - 265 mg/dL   INR    Collection Time: 11/28/17  7:25 AM   Result Value Ref Range    INR 1.12 0.86 - 1.14   Magnesium    Collection Time: 11/28/17  7:25 AM   Result Value Ref Range    Magnesium 2.0 1.6 - 2.3 mg/dL   Phosphorus    Collection Time: 11/28/17  7:25 AM   Result Value Ref Range    Phosphorus 3.0 2.5 - 4.5 mg/dL   Protein total    Collection Time: 11/28/17  7:25 AM   Result Value Ref Range    Protein Total 7.1 6.8 - 8.8 g/dL   TSH with free T4 reflex    Collection Time: 11/28/17  7:25 AM   Result Value Ref Range    TSH 1.82 0.40 - 4.00 mU/L   Vitamin D Deficiency    Collection Time: 11/28/17  7:25 AM   Result Value Ref Range    Vitamin D Deficiency screening 18 (L) 20 - 75 ug/L   CBC with platelets differential    Collection Time: 11/28/17  7:25 AM   Result Value Ref Range    WBC 5.6 4.0 - 11.0 10e9/L    RBC Count 4.97 4.4 - 5.9 10e12/L    Hemoglobin 15.2 13.3 - 17.7 g/dL    Hematocrit 44.1 40.0 - 53.0 %    MCV 89 78 - 100 fl    MCH 30.6 26.5 - 33.0 pg    MCHC 34.5 31.5 - 36.5 g/dL    RDW 12.1 10.0 - 15.0 %    Platelet Count 238 150 - 450  10e9/L    Diff Method Automated Method     % Neutrophils 43.1 %    % Lymphocytes 45.8 %    % Monocytes 7.3 %    % Eosinophils 2.9 %    % Basophils 0.7 %    % Immature Granulocytes 0.2 %    Nucleated RBCs 0 0 /100    Absolute Neutrophil 2.4 1.6 - 8.3 10e9/L    Absolute Lymphocytes 2.6 0.8 - 5.3 10e9/L    Absolute Monocytes 0.4 0.0 - 1.3 10e9/L    Absolute Eosinophils 0.2 0.0 - 0.7 10e9/L    Absolute Basophils 0.0 0.0 - 0.2 10e9/L    Abs Immature Granulocytes 0.0 0 - 0.4 10e9/L    Absolute Nucleated RBC 0.0    Erythrocyte sedimentation rate auto    Collection Time: 11/28/17  7:25 AM   Result Value Ref Range    Sed Rate 4 0 - 15 mm/h   Routine UA with microscopic    Collection Time: 11/28/17  7:30 AM   Result Value Ref Range    Color Urine Yellow     Appearance Urine Clear     Glucose Urine Negative NEG^Negative mg/dL    Bilirubin Urine Negative NEG^Negative    Ketones Urine Negative NEG^Negative mg/dL    Specific Gravity Urine 1.014 1.003 - 1.035    Blood Urine Negative NEG^Negative    pH Urine 5.0 5.0 - 7.0 pH    Protein Albumin Urine Negative NEG^Negative mg/dL    Urobilinogen mg/dL 0.0 0.0 - 2.0 mg/dL    Nitrite Urine Negative NEG^Negative    Leukocyte Esterase Urine Negative NEG^Negative    Source Midstream Urine     WBC Urine 1 0 - 2 /HPF    RBC Urine 0 0 - 2 /HPF    Mucous Urine Present (A) NEG^Negative /LPF   Microalbumin quantitative random urine    Collection Time: 11/28/17  7:30 AM   Result Value Ref Range    Creatinine Urine 129 mg/dL    Albumin Urine mg/L <5 mg/L    Albumin Urine mg/g Cr Unable to calculate due to low value 0 - 17 mg/g Cr   Glucose in a Series: Draw +30 minutes    Collection Time: 11/28/17  8:00 AM   Result Value Ref Range    Glucose 140 (H) 70 - 99 mg/dL   Insulin in a Series: Draw +30 minutes    Collection Time: 11/28/17  8:00 AM   Result Value Ref Range    Insulin 34.9 (H) 3 - 25 mU/L   Glucose in a Series: Draw +60 minutes    Collection Time: 11/28/17  8:30 AM   Result Value Ref Range     Glucose 112 (H) 70 - 99 mg/dL   Insulin in a Series: Draw +60 minutes    Collection Time: 11/28/17  8:30 AM   Result Value Ref Range    Insulin 24.6 3 - 25 mU/L   Glucose in a Series: Draw +90 minutes    Collection Time: 11/28/17  9:00 AM   Result Value Ref Range    Glucose 97 70 - 99 mg/dL   Insulin in a Series: Draw +90 minutes    Collection Time: 11/28/17  9:00 AM   Result Value Ref Range    Insulin 20.4 3 - 25 mU/L   Glucose in a Series: Draw +120 minutes    Collection Time: 11/28/17  9:30 AM   Result Value Ref Range    Glucose 95 70 - 99 mg/dL   Insulin in a Series: Draw +120 minutes    Collection Time: 11/28/17  9:30 AM   Result Value Ref Range    Insulin 20.7 3 - 25 mU/L   Glucose by meter    Collection Time: 11/28/17  9:33 AM   Result Value Ref Range    Glucose 95 70 - 99 mg/dL   General PFT Lab (Please always keep checked)    Collection Time: 11/28/17 10:45 AM   Result Value Ref Range    FVC-Pred 5.03 L    FVC-Pre 4.80 L    FVC-%Pred-Pre 95 %    FEV1-Pre 3.64 L    FEV1-%Pred-Pre 88 %    FEV1FVC-Pred 82 %    FEV1FVC-Pre 76 %    FEFMax-Pred 9.85 L/sec    FEFMax-Pre 10.72 L/sec    FEFMax-%Pred-Pre 108 %    FEF2575-Pred 4.15 L/sec    FEF2575-Pre 2.96 L/sec    ZAA2593-%Pred-Pre 71 %    ExpTime-Pre 7.48 sec    FIFMax-Pre 8.62 L/sec    FEV1FEV6-Pred 83 %    FEV1FEV6-Pre 78 %   Cystic Fibrosis Culture Aerob Bacterial    Collection Time: 11/28/17 11:20 AM   Result Value Ref Range    Specimen Description Throat     Special Requests Specimen collected in eSwab transport (white cap)     Culture Micro PENDING        PFT: The spirometry is normal.  When compared to 11/22/2016, the FEV1 and FVC have decreased.    Answers for HPI/ROS submitted by the patient on 11/22/2017   Annual Exam:  Frequency of exercise:: 4-5 days/week  Duration of exercise:: 30-45 minutes

## 2017-11-28 NOTE — NURSING NOTE
Chief Complaint   Patient presents with     Cystic Fibrosis     Follow up on Mitesh and his CF     Robbin Kline CMA at 11:14 AM on 11/28/2017

## 2017-11-29 LAB — TESTOST SERPL-MCNC: 495 NG/DL (ref 240–950)

## 2017-11-30 LAB — IGE SERPL-ACNC: 167 KIU/L (ref 0–114)

## 2017-11-30 NOTE — PROGRESS NOTES
CF Annual Nutrition Assessment    Reason for Assessment  Assessed during Dr. Carole Ward clinic r/t increased nutrition risk with diagnosis of CF per protocol.    Nutrition Significant PMH  Mild Lung Disease  Pancreatic Sufficient    Anthropometric Assessment  Height: 172.7 cm  IBW based on BMI 22 for females and 23 for males per [facility] Foundation recs: 69.1 kg  Today's Weight: 88 kg (actual weight)   %IBW: 127%  Body mass index is 29.5 kg/(m^2).   Current weight is considered: Normal BMI and at CF goal    Wt Readings from Last 10 Encounters:   11/28/17 88 kg (194 lb 0.1 oz)   11/28/17 87.6 kg (193 lb 2 oz)   11/22/16 88.5 kg (195 lb 1.7 oz)   11/17/15 89.7 kg (197 lb 12 oz)   11/18/14 95 kg (209 lb 7 oz)   06/03/14 97.2 kg (214 lb 4.6 oz)   03/03/14 91.7 kg (202 lb 2.6 oz)   02/03/14 91.7 kg (202 lb 2.6 oz)   02/03/14 92.6 kg (204 lb 3.2 oz)   12/02/13 89.4 kg (197 lb 1.5 oz)     Weight Status: Although technically overweight per BMI pt holds a large amount of lean body mass which likely skews BMI categorization.  Per DEXA done today his body composition is 78% lean mass, 22% fat mass which is improved from his DEXA 3-4 years ago which showed a distribution closer to 70%/30%.  Pt happy to see this change as he has been working to be more physically active and eat a healthy diet.  Recently ran the [place] and doing other endurance activities to promote physical health. Pt's wife is a  and helps him with diverse activities to maintain/promote muscle mass gains.     MALNUTRITION  Patient does not meet 2 of the criteria necessary for diagnosing malnutrition.    Diet History and Assessment  Diet Preferences/Allergies/Intolerances:  Regular    Intake Recall/Comments:  Tries to eat a healthy diet, tends to be heavier on carbs and protein when he is training for an event such as his recent marathon.  Tries to avoid excessive amounts of processed foods.  Average of 2-3 meals and a few snacks per  day.      Supplements: No    Estimated Energy and Protein Needs  Estimation based on weight/lean body mass maintenance with Mild lung disease and pancreatic sufficient.    [id number] kcals/day =  25-30 kcals/kg   g protein/day = 1-1.3 g/kg     Laboratory Assessment    Vitamin A   Date Value Ref Range Status   02/03/2014 0.53  Final     Comment:     Reference range: 0.30 to 1.20  Unit: mg/L     Vitamin D Deficiency screening   Date Value Ref Range Status   11/28/2017 18 (L) 20 - 75 ug/L Final     Comment:     Season, race, dietary intake, and treatment affect the concentration of   25-hydroxy-Vitamin D. Values may decrease during winter months and increase   during summer months. Values 20-29 ug/L may indicate Vitamin D insufficiency   and values <20 ug/L may indicate Vitamin D deficiency.  Vitamin D determination is routinely performed by an immunoassay specific for   25 hydroxyvitamin D3.  If an individual is on vitamin D2 (ergocalciferol)   supplementation, please specify 25 OH vitamin D2 and D3 level determination by   LCMSMS test [id number].       Vitamin E   Date Value Ref Range Status   02/03/2014 6.8  Final     Comment:     Reference range: 5.5 to 18.0  Unit: mg/L  (Note)  Test developed and characteristics determined by [facility]. See Compliance Statement B: [email or web address]     Iron   Date Value Ref Range Status   11/28/2017 49 35 - 180 ug/dL Final     Cholesterol   Date Value Ref Range Status   11/28/2017 190 <200 mg/dL Final     Triglycerides   Date Value Ref Range Status   11/28/2017 144 <150 mg/dL Final     HDL Cholesterol   Date Value Ref Range Status   11/28/2017 53 >39 mg/dL Final     LDL Cholesterol Calculated   Date Value Ref Range Status   11/28/2017 107 (H) <100 mg/dL Final     Comment:     Above desirable:  100-129 mg/dl  Borderline High:  130-159 mg/dL  High:             160-189 mg/dL  Very high:       >189 mg/dl       VLDL-Cholesterol   Date Value Ref Range Status   02/03/2014 25 0 -  30 mg/dL Final     Cholesterol/HDL Ratio   Date Value Ref Range Status   02/03/2014 3.9 0.0 - 5.0 Final     *Vitamin A/E levels pending for 11/28 lab draw.     DEXA: (11/28/17) Bone density showing less than normal for age.   OGTT: (11/28/17) WNL    Current Vitamin/Mineral Supplements: (none)      NUTRITION DIAGNOSIS  Potential for increased nutrient needs r/t CF lung disease and high level of physical activity, LBM status.   INTERVENTIONS/RECOMMENDATIONS  1) Reviewed all annual studies 1:1, encouraged vitamin D supplementation (as below) and resistance exercise for bone mineral density.  Encouraged pt to continue current level of physical activity to maintain/improve his body composition which has showed excellent changes over the past few years per DEXA analysis.  Tips for healthy diet and hydration reviewed as well.  2) Vitamin D3 2000 units/day during winter months, recheck level in 3-6 months to see if dose adjustment warranted.  Explained to pt that if he does not have a pulmonary clinic follow-up at that time he should pursue vitamin D testing through his PCP clinic.  Pt verbalized his understanding.  Plans to purchase supplement OTC so a prescription was not sent.     Patient Understanding: Good  Expected Compliance: Good  Follow-Up Plans: Per protocol    GOALS:  Begin 2000 units vitamin D per day to correct current deficiency.     FOLLOW-UP/MONITORING:  Visit patient within 12 month(s)    Time Spent In Face-to-Face Patient Interactions: 15 minutes      Leta Escalona MS, RD, LD (pager [phone])  Cystic Fibrosis/Lung Transplant Dietitian      -Available Tues-Fri 8 AM-4:30 PM. On Mondays please contact pager [phone] (Zee Burns RD) and on weekends/holidays contact coverage dietitian at pager [phone] (inpatient use only).

## 2017-12-02 LAB
A-TOCOPHEROL VIT E SERPL-MCNC: 10.3 MG/L (ref 5.5–18)
ANNOTATION COMMENT IMP: NORMAL
BETA+GAMMA TOCOPHEROL SERPL-MCNC: 1.4 MG/L (ref 0–6)
RETINYL PALMITATE SERPL-MCNC: 0.03 MG/L (ref 0–0.1)
VIT A SERPL-MCNC: 0.59 MG/L (ref 0.3–1.2)

## 2017-12-03 LAB
BACTERIA SPEC CULT: NORMAL
Lab: NORMAL
SPECIMEN SOURCE: NORMAL

## 2017-12-04 ASSESSMENT — ANXIETY QUESTIONNAIRES
1. FEELING NERVOUS, ANXIOUS, OR ON EDGE: NOT AT ALL
6. BECOMING EASILY ANNOYED OR IRRITABLE: NOT AT ALL
3. WORRYING TOO MUCH ABOUT DIFFERENT THINGS: NOT AT ALL
5. BEING SO RESTLESS THAT IT IS HARD TO SIT STILL: NOT AT ALL
IF YOU CHECKED OFF ANY PROBLEMS ON THIS QUESTIONNAIRE, HOW DIFFICULT HAVE THESE PROBLEMS MADE IT FOR YOU TO DO YOUR WORK, TAKE CARE OF THINGS AT HOME, OR GET ALONG WITH OTHER PEOPLE: NOT DIFFICULT AT ALL
GAD7 TOTAL SCORE: 0
2. NOT BEING ABLE TO STOP OR CONTROL WORRYING: NOT AT ALL
7. FEELING AFRAID AS IF SOMETHING AWFUL MIGHT HAPPEN: NOT AT ALL

## 2017-12-04 ASSESSMENT — PATIENT HEALTH QUESTIONNAIRE - PHQ9
SUM OF ALL RESPONSES TO PHQ QUESTIONS 1-9: 0
5. POOR APPETITE OR OVEREATING: NOT AT ALL

## 2017-12-04 NOTE — PROGRESS NOTES
Adult Cystic Fibrosis Program  Annual Psychosocial Assessment    Presenting Information:  Mitesh is a 39-year-old male with cystic fibrosis, presenting in CF clinic for a regular/yearly follow up with primary CF provider, Dr. Ward.  Met with Mitesh for introduction to SW and annual psychosocial assessment.     Living situation:  Mitesh lives with his wife Ashley in [place], [place].   They own a house.  They do not have pets.  He denies any concerns about his living situation.      Family Constellation:  Mitesh was raised by his biological parents in [place].  He is the youngest child and has 4 sibling(s): 3 brothers and 1 sister.  He is  to Ashley and has one son: Rayshawn age 21 who he adopted and is Ashley's biological son from a previous relationship.  He reports that Rayshawn is going well and currently lives in [place] where attends D.  His parents continue to live in [place] are in good health overall, he adds that his parents are in their 70's, his dad is 77 years old and has some arthritis. His [place] currently [place].     Social Support:  Mitesh reports good social support.  He gets along well with family members and draws additional support from friends.  He describes his wife Ashley and son Rayshawn as very supportive.  He reports having a very close family and has a group of high school friends that are still close.   He has a brother who also has CBAVD but he does not believe that he has been seen for a CF work-up although he has encouraged him to, otherwise he does not have any connections to the CF community.  He reports that his Zoroastrianism sebastian is an important part of his life.      Adjustment to Illness:  Mitesh  was diagnosed with CF during a fertility work-up, at the age of 32 (2013).  He reports that this encounter was not a very good experience as he felt the Urologist was not sensitive when he delivered the information.  He felt that after he told this doctor that  "he was not interested in IVF due to his Anabaptist beliefs he eric that he was judged and not understood.  He reports no medical complications while growing up other than frequent ear infections and mild asthma symptoms.  He participated in many sports growing up including baseball and wrestling, he tried to play baseball in college but reports that \"it didn't work out\".    Mitesh describes his current health status as \"good\".  Clinically, he has mild lung disease with normal spirometry.  He uses an inhaler prior to exercise and does not do bronchial drainage therapy.  He was offered to initiate Ivacaftor but declined to.  He gets exercise through running, curling and golfing.  He ran a marathon this past summer.  He denies any health problems that interfere with activities of daily living.     Mitesh is currently seen yearly.          Health Care Directive:  Mitesh has not previously received Health Care Directive education.  This SW provided/reviewed education, including concept/purpose of health care directive, default health care agents and how to complete a directive.   Mitesh is comfortable with default health care agent(s) (his wife life) in absence of a directive. He is not currently interested in reviewing a health care directive.      Education:  Mitesh completed high school, he graduated from [employer].  He attended the [employer] and earned a degree in Accounting.      Employment:  Mitesh is employed full-time as an .  He reports a supportive work environment and denies any employment concerns.  He is benefits eligible for health, short and long-term disability.  Mitesh s spouse (Ashley) is employed as a .    Finances:  Mitesh and Ashley receive income from wages and denies any financial concerns.     Insurance:  Mitesh is insured through employer-based health insurance through [employer].  He denies any insurance concerns.      Mental " "Health/Coping:  Mitesh denies any current or past symptoms indicative of mood, anxiety, eating, learning or other mental health disorder.  Today he describes his mood as \"good\".  Mitesh reports generally low stress levels.  Mitesh identifies Cheondoism sebastian as important in his life.    Mitesh completed the following screens today:  PHQ-9: Score 0: Indicating no symptoms of depression and described as not difficult at all in functioning.  ROLLY-7: Score 0: Indicating no symptoms of depression and described as not difficult at all in functioning.      Mitesh agreed with the scoring above and denied any symptoms not covered on these screens.    Chemical Health:  Mitesh denies tobacco or illicit drug use.  He reports that he does not drink much alcohol, adding that he does not tolerate it well and so he chooses to mostly avoid it although did drink more often in college.      Leisure Activities/Interests:   Mitesh enjoys golfing, running and is in a curling club.      Intervention:  -Introduction to   -Psychosocial Assessment  -Health Care Directive education  -Mental Health Screening    Assessment:  Mitesh appeared to be open in his responses. This was his first time having a psychosocial assessment in CF clinic, he has CBAVD and minimal pulmonary symptoms at this time, and typically comes to clinic yearly.  Mitesh seems to be psychosocially stable overall, with access to relevant resources and supports.  No concerns expressed/noted.      Plan:  Re-consult for any psychosocial needs that may arise.    Complete psychosocial assessment annually.    TRAN Guevara, [facility]  Adult Cystic Fibrosis   Ph: [phone]  Pager: [phone]            "

## 2017-12-05 ASSESSMENT — ANXIETY QUESTIONNAIRES: GAD7 TOTAL SCORE: 0

## 2018-04-19 ENCOUNTER — TELEPHONE (OUTPATIENT)
Dept: PULMONOLOGY | Facility: CLINIC | Age: 38
End: 2018-04-19

## 2018-04-19 NOTE — TELEPHONE ENCOUNTER
Prior Authorization Retail Medication Request    Medication/Dose: Symbicort  ICD code (if different than what is on RX):  E84.0  Previously Tried and Failed:    Rationale:   RENEWAL..    Use covermymeds key [id number]    Insurance Name:    Insurance ID:        Pharmacy Information (if different than what is on RX)  Name:  [facility]   Phone:  [phone]

## 2018-04-19 NOTE — TELEPHONE ENCOUNTER
Symbicort is no longer formulary. Preferred products are Breo (this has a co-pay card) or Advair. Both have $40 co-pay. Will route to RN to send new RX.

## 2018-04-23 DIAGNOSIS — E84.0 CYSTIC FIBROSIS WITH PULMONARY MANIFESTATIONS (H): Primary | ICD-10-CM

## 2018-05-01 DIAGNOSIS — E84.9 CF (CYSTIC FIBROSIS) (H): ICD-10-CM

## 2018-05-01 RX ORDER — BUDESONIDE AND FORMOTEROL FUMARATE DIHYDRATE 80; 4.5 UG/1; UG/1
2 AEROSOL RESPIRATORY (INHALATION) 2 TIMES DAILY
Qty: 1 INHALER | Refills: 11 | Status: SHIPPED | OUTPATIENT
Start: 2018-05-01 | End: 2018-05-17

## 2018-05-11 ENCOUNTER — TELEPHONE (OUTPATIENT)
Dept: PULMONOLOGY | Facility: CLINIC | Age: 38
End: 2018-05-11

## 2018-05-11 NOTE — TELEPHONE ENCOUNTER
.Prior Authorization Retail Medication Request    Medication/Dose: Symbicort  ICD code (if different than what is on RX):  E84.0 CF  Previously Tried and Failed:    Rationale:  RENEWAL - PATIENT IS INSISTENT ON RECEIVING AUTHORIZATION FOR SYMBICORT ONLY! He is aware that it is non-formulary but wants a PA run.     Insurance Name:    Insurance ID:        Pharmacy Information (if different than what is on RX)  Name:    Phone:

## 2018-05-14 NOTE — TELEPHONE ENCOUNTER
Problem: Patient is unable to get medication from pharmacy     Medication: Inhaled Corticosteroid     Pharmacy: Does not apply/not a pharmacy issue    Intervention: Processed test claim    Result: Issue resolved    Additional Information:    Per test claim, Breo and Advair have $40 co-pay and generic Air-Duo (Fluticasone-Salmeterol) has a $12 co-pay. Symbicort is not covered. Please send new RX to [facility] in [place].

## 2018-05-17 DIAGNOSIS — E84.9 CF (CYSTIC FIBROSIS) (H): ICD-10-CM

## 2018-05-17 DIAGNOSIS — E84.0 CYSTIC FIBROSIS WITH PULMONARY MANIFESTATIONS (H): Primary | ICD-10-CM

## 2018-05-17 RX ORDER — BUDESONIDE AND FORMOTEROL FUMARATE DIHYDRATE 80; 4.5 UG/1; UG/1
2 AEROSOL RESPIRATORY (INHALATION) 2 TIMES DAILY
Qty: 1 INHALER | Refills: 11 | Status: SHIPPED | OUTPATIENT
Start: 2018-05-17 | End: 2019-03-12

## 2018-05-17 RX ORDER — FLUTICASONE PROPIONATE AND SALMETEROL 55; 14 UG/1; UG/1
1 POWDER, METERED RESPIRATORY (INHALATION) 2 TIMES DAILY
Qty: 1 EACH | Refills: 3 | Status: SHIPPED | OUTPATIENT
Start: 2018-05-17 | End: 2018-09-19

## 2018-05-23 NOTE — TELEPHONE ENCOUNTER
Prior Authorization Approval    Authorization Effective Date: 4/23/2018  Authorization Expiration Date: 5/23/2019  Medication: Symbicort (APPROVED)  Approved Dose/Quantity: 1 INHALER PER MONTH  Reference #:     Insurance Company: [employer] - Phone [phone] Fax [phone]  Expected CoPay:       CoPay Card Available:      Foundation Assistance Needed:    Which Pharmacy is filling the prescription (Not needed for infusion/clinic administered): [facility] [address] [place] [address] [address]  Pharmacy Notified:  YES  Patient Notified:  YES

## 2018-05-23 NOTE — TELEPHONE ENCOUNTER
PA Initiation    Medication: Symbicort (PENDING)  Insurance Company: [facility] - Phone [phone] Fax [phone]  Pharmacy Filling the Rx: [facility] [address] [place] [address] [id number]  Filling Pharmacy Phone:    Filling Pharmacy Fax:    Start Date: 5/16/2018    [facility] may want him to try Dulera, med list did not show this was ever tried. Submitted anyway

## 2018-09-19 ENCOUNTER — OFFICE VISIT (OUTPATIENT)
Dept: FAMILY MEDICINE | Facility: CLINIC | Age: 38
End: 2018-09-19
Payer: COMMERCIAL

## 2018-09-19 VITALS
TEMPERATURE: 98.1 F | BODY MASS INDEX: 30.1 KG/M2 | OXYGEN SATURATION: 98 % | SYSTOLIC BLOOD PRESSURE: 124 MMHG | HEIGHT: 68 IN | RESPIRATION RATE: 20 BRPM | HEART RATE: 63 BPM | DIASTOLIC BLOOD PRESSURE: 86 MMHG | WEIGHT: 198.6 LBS

## 2018-09-19 DIAGNOSIS — H65.02 ACUTE SEROUS OTITIS MEDIA OF LEFT EAR, RECURRENCE NOT SPECIFIED: Primary | ICD-10-CM

## 2018-09-19 PROCEDURE — 99213 OFFICE O/P EST LOW 20 MIN: CPT | Performed by: PHYSICIAN ASSISTANT

## 2018-09-19 ASSESSMENT — PAIN SCALES - GENERAL: PAINLEVEL: NO PAIN (0)

## 2018-09-19 NOTE — PROGRESS NOTES
SUBJECTIVE:   Mitesh Tovar is a 37 year old male who presents to clinic today for the following health issues:      ENT Symptoms             Symptoms: cc Present Absent Comment   Fever/Chills   x    Fatigue   x    Muscle Aches   x    Eye Irritation   x    Sneezing   x    Nasal John/Drg   x    Sinus Pressure/Pain   x    Loss of smell   x    Dental pain   x    Sore Throat   x    Swollen Glands   x    Ear Pain/Fullness  lEFT  Feels like water is in there, is popping   Cough   x    Wheeze   x    Chest Pain   x    Shortness of breath   x    Rash   x    Other   x      Symptom duration:  about 2 weeks   Symptom severity:  None   Treatments tried:  Yawning, q-tips,chewing   Contacts:  None     No recent coryza or allergies.      No airplane travel or swimming.      Has CF -related asthma, not full blown CF            Allergies   Allergen Reactions     Cefaclor      rash     Sulfamethoxazole W/Trimethoprim      bactrim/rash       Patient Active Problem List   Diagnosis     Acute laryngitis     Pain in joint, lower leg     Superficial injury of cornea     Pneumonia     Seasonal allergies     Congenital absence of vas deferens     Asthma     Cystic fibrosis with pulmonary manifestations     CF (cystic fibrosis) (H)       Past Medical History:   Diagnosis Date     Bilateral external ear infections     frequent as a child     Congenital absence of vas deferens      Pneumonia     x 2, during college years     Seasonal allergies          Current Outpatient Prescriptions on File Prior to Visit:  albuterol (PROAIR HFA/PROVENTIL HFA/VENTOLIN HFA) 108 (90 BASE) MCG/ACT Inhaler Inhale 2 puffs into the lungs every 6 hours as needed for shortness of breath / dyspnea or wheezing   budesonide-formoterol (SYMBICORT) 80-4.5 MCG/ACT Inhaler Inhale 2 puffs into the lungs 2 times daily     No current facility-administered medications on file prior to visit.     Social History   Substance Use Topics     Smoking status: Never Smoker      "Smokeless tobacco: Never Used     Alcohol use Not on file       ROS:   Constitutional: no fevers  ENT: as above  resp no cough, hx asthma    OBJECTIVE:  /86 (BP Location: Right arm, Patient Position: Sitting, Cuff Size: Adult Regular)  Pulse 63  Temp 98.1  F (36.7  C) (Oral)  Resp 20  Ht 5' 8\" (1.727 m)  Wt 198 lb 9.6 oz (90.1 kg)  SpO2 98%  BMI 30.2 kg/m2   General:   awake, alert, and cooperative.  NAD.   Head: Normocephalic, atraumatic.  Eyes: Conjunctiva clear,   ENT: . RT Canal clear, TM intact and clear, LEFT CANAL small amount of wax TM intact and clear  Neuro: Alert and oriented - normal speech.    ASSESSMENT:    ICD-10-CM    1. Acute serous otitis media of left ear, recurrence not specified H65.02 OTOLARYNGOLOGY REFERRAL       PLAN: over the counter pseudoepedrine.  prn ENT   Advised about symptoms which might herald more serious problems.                         "

## 2018-09-19 NOTE — PATIENT INSTRUCTIONS
TRIAL over the counter PSEUDOEPHEDRINE (during the day).    Earache, No Infection (Adult)  Earaches can happen without an infection. This occurs when air and fluid build up behind the eardrum causing a feeling of fullness and discomfort and reduced hearing. This is called otitis media with effusion (OME) or serous otitis media. It means there is fluid in the middle ear. It is not the same as acute otitis media, which is typically from infection.  OME can happen when you have a cold if congestion blocks the passage that drains the middle ear. This passage is called the eustachian tube. OME may also occur with nasal allergies or after a bacterial middle ear infection.    The pain or discomfort may come and go. You may hear clicking or popping sounds when you chew or swallow. You may feel that your balance is off. Or you may hear ringing in the ear.  It often takes from several weeks up to 3 months for the fluid to clear on its own. Oral pain relievers and ear drops help if there is pain. Decongestants and antihistamines sometimes help. Antibiotics don't help since there is no infection. Your doctor may prescribe a nasal spray to help reduce swelling in the nose and eustachian tube. This can allow the ear to drain.  If your OME doesn't improve after 3 months, surgery may be used to drain the fluid and insert a small tube in the eardrum to allow continued drainage.  Because the middle ear fluid can become infected, it is important to watch for signs of an ear infection which may develop later. These signs include increased ear pain, fever, or drainage from the ear.  Home care  The following guidelines will help you care for yourself at home:    You may use over-the-counter medicine as directed to control pain, unless another medicine was prescribed. If you have chronic liver or kidney disease or ever had a stomach ulcer or GI bleeding, talk with your doctor before using these medicines. Aspirin should never be used in  anyone under 18 years of age who is ill with a fever. It may cause severe liver damage.    You may use over-the-counter decongestants such as phenylephrine or pseudoephedrine. But they are not always helpful. Don't use nasal spray decongestants more than 3 days. Longer use can make congestion worse. Prescription nasal sprays from your doctor don't typically have those restrictions.    Antihistamines may help if you are also having allergy symptoms.    You may use medicines such as guaifenesin to thin mucus and promote drainage.  Follow-up care  Follow up with your healthcare provider or as advised if you are not feeling better after 3 days.  When to seek medical advice  Call your healthcare provider right away if any of the following occur:    Your ear pain gets worse or does not start to improve     Fever of 100.4 F (38 C) or higher, or as directed by your healthcare provider    Fluid or blood draining from the ear    Headache or sinus pain    Stiff neck    Unusual drowsiness or confusion  Date Last Reviewed: 10/1/2016    4713-7211 [employer]. [address] [place] [address]. All rights reserved. This information is not intended as a substitute for professional medical care. Always follow your healthcare professional's instructions.

## 2018-09-19 NOTE — LETTER
My Asthma Action Plan  Name: Mitesh Tovar   YOB: 1980  Date: 9/19/2018   My doctor: TORITO Cornell   My clinic: [facility]        My Control Medicine: { :341429}  My Rescue Medicine: { :779634}  {AAP include Oral Steroid:177970} My Asthma Severity: { :851810}  Avoid your asthma triggers: { :326819}        {Is patient a child or adult?:748033}       GREEN ZONE   Good Control    I feel good    No cough or wheeze    Can work, sleep and play without asthma symptoms       Take your asthma control medicine every day.     1. If exercise triggers your asthma, take your rescue medication    15 minutes before exercise or sports, and    During exercise if you have asthma symptoms  2. Spacer to use with inhaler: If you have a spacer, make sure to use it with your inhaler             YELLOW ZONE Getting Worse  I have ANY of these:    I do not feel good    Cough or wheeze    Chest feels tight    Wake up at night   1. Keep taking your Green Zone medications  2. Start taking your rescue medicine:    every 20 minutes for up to 1 hour. Then every 4 hours for 24-48 hours.  3. If you stay in the Yellow Zone for more than 12-24 hours, contact your doctor.  4. If you do not return to the Green Zone in 12-24 hours or you get worse, start taking your oral steroid medicine if prescribed by your provider.           RED ZONE Medical Alert - Get Help  I have ANY of these:    I feel awful    Medicine is not helping    Breathing getting harder    Trouble walking or talking    Nose opens wide to breathe       1. Take your rescue medicine NOW  2. If your provider has prescribed an oral steroid medicine, start taking it NOW  3. Call your doctor NOW  4. If you are still in the Red Zone after 20 minutes and you have not reached your doctor:    Take your rescue medicine again and    Call 911 or go to the emergency room right away    See your regular doctor within 2 weeks of an Emergency Room or  Urgent Care visit for follow-up treatment.          Annual Reminders:  Meet with Asthma Educator,  Flu Shot in the Fall, consider Pneumonia Vaccination for patients with asthma (aged 19 and older).    Pharmacy: [facility] [address] [place] [address] [place], [place] - [address]                      Asthma Triggers  How To Control Things That Make Your Asthma Worse    Triggers are things that make your asthma worse.  Look at the list below to help you find your triggers and what you can do about them.  You can help prevent asthma flare-ups by staying away from your triggers.      Trigger                                                          What you can do   Cigarette Smoke  Tobacco smoke can make asthma worse. Do not allow smoking in your home, car or around you.  Be sure no one smokes at a child s day care or school.  If you smoke, ask your health care provider for ways to help you quit.  Ask family members to quit too.  Ask your health care provider for a referral to Quit Plan to help you quit smoking, or call 2-183-034-PLAN.     Colds, Flu, Bronchitis  These are common triggers of asthma. Wash your hands often.  Don t touch your eyes, nose or mouth.  Get a flu shot every year.     Dust Mites  These are tiny bugs that live in cloth or carpet. They are too small to see. Wash sheets and blankets in hot water every week.   Encase pillows and mattress in dust mite proof covers.  Avoid having carpet if you can. If you have carpet, vacuum weekly.   Use a dust mask and HEPA vacuum.   Pollen and Outdoor Mold  Some people are allergic to trees, grass, or weed pollen, or molds. Try to keep your windows closed.  Limit time out doors when pollen count is high.   Ask you health care provider about taking medicine during allergy season.     Animal Dander  Some people are allergic to skin flakes, urine or saliva from pets with fur or feathers. Keep pets with fur or feathers out of your home.    If you can t  keep the pet outdoors, then keep the pet out of your bedroom.  Keep the bedroom door closed.  Keep pets off cloth furniture and away from stuffed toys.     Mice, Rats, and Cockroaches  Some people are allergic to the waste from these pests.   Cover food and garbage.  Clean up spills and food crumbs.  Store grease in the refrigerator.   Keep food out of the bedroom.   Indoor Mold  This can be a trigger if your home has high moisture. Fix leaking faucets, pipes, or other sources of water.   Clean moldy surfaces.  Dehumidify basement if it is damp and smelly.   Smoke, Strong Odors, and Sprays  These can reduce air quality. Stay away from strong odors and sprays, such as perfume, powder, hair spray, paints, smoke incense, paint, cleaning products, candles and new carpet.   Exercise or Sports  Some people with asthma have this trigger. Be active!  Ask your doctor about taking medicine before sports or exercise to prevent symptoms.    Warm up for 5-10 minutes before and after sports or exercise.     Other Triggers of Asthma  Cold air:  Cover your nose and mouth with a scarf.  Sometimes laughing or crying can be a trigger.  Some medicines and food can trigger asthma.

## 2018-09-19 NOTE — MR AVS SNAPSHOT
After Visit Summary   9/19/2018    Mitesh Tovar    MRN: [id number]           Patient Information     Date Of Birth          1980        Visit Information        Provider Department      9/19/2018 5:00 PM Jose Black PA [facility]        Today's Diagnoses     Acute serous otitis media of left ear, recurrence not specified    -  1      Care Instructions    TRIAL over the counter PSEUDOEPHEDRINE (during the day).    Earache, No Infection (Adult)  Earaches can happen without an infection. This occurs when air and fluid build up behind the eardrum causing a feeling of fullness and discomfort and reduced hearing. This is called otitis media with effusion (OME) or serous otitis media. It means there is fluid in the middle ear. It is not the same as acute otitis media, which is typically from infection.  OME can happen when you have a cold if congestion blocks the passage that drains the middle ear. This passage is called the eustachian tube. OME may also occur with nasal allergies or after a bacterial middle ear infection.    The pain or discomfort may come and go. You may hear clicking or popping sounds when you chew or swallow. You may feel that your balance is off. Or you may hear ringing in the ear.  It often takes from several weeks up to 3 months for the fluid to clear on its own. Oral pain relievers and ear drops help if there is pain. Decongestants and antihistamines sometimes help. Antibiotics don't help since there is no infection. Your doctor may prescribe a nasal spray to help reduce swelling in the nose and eustachian tube. This can allow the ear to drain.  If your OME doesn't improve after 3 months, surgery may be used to drain the fluid and insert a small tube in the eardrum to allow continued drainage.  Because the middle ear fluid can become infected, it is important to watch for signs of an ear infection which may develop later. These signs include  increased ear pain, fever, or drainage from the ear.  Home care  The following guidelines will help you care for yourself at home:    You may use over-the-counter medicine as directed to control pain, unless another medicine was prescribed. If you have chronic liver or kidney disease or ever had a stomach ulcer or GI bleeding, talk with your doctor before using these medicines. Aspirin should never be used in anyone under 18 years of age who is ill with a fever. It may cause severe liver damage.    You may use over-the-counter decongestants such as phenylephrine or pseudoephedrine. But they are not always helpful. Don't use nasal spray decongestants more than 3 days. Longer use can make congestion worse. Prescription nasal sprays from your doctor don't typically have those restrictions.    Antihistamines may help if you are also having allergy symptoms.    You may use medicines such as guaifenesin to thin mucus and promote drainage.  Follow-up care  Follow up with your healthcare provider or as advised if you are not feeling better after 3 days.  When to seek medical advice  Call your healthcare provider right away if any of the following occur:    Your ear pain gets worse or does not start to improve     Fever of 100.4 F (38 C) or higher, or as directed by your healthcare provider    Fluid or blood draining from the ear    Headache or sinus pain    Stiff neck    Unusual drowsiness or confusion  Date Last Reviewed: 10/1/2016    8856-0309 [employer] ([employer]). [address] [place] [address]. All rights reserved. This information is not intended as a substitute for professional medical care. Always follow your healthcare professional's instructions.                Follow-ups after your visit        Additional Services     OTOLARYNGOLOGY REFERRAL       Your provider has referred you to: [facility]: [facility] [phone]    [email or web address]    Please be aware that coverage of these services is subject to the terms and limitations of your health insurance plan.  Call member services at your health plan with any benefit or coverage questions.      Please bring the following with you to your appointment:    (1) Any X-Rays, CTs or MRIs which have been performed.  Contact the facility where they were done to arrange for  prior to your scheduled appointment.   (2) List of current medications  (3) This referral request   (4) Any documents/labs given to you for this referral                  Follow-up notes from your care team     Return if symptoms worsen or fail to improve.      Your next 10 appointments already scheduled     Dec 18, 2018  7:30 AM CST   CF LOOP with  PFL CF   [facility] Pulmonary Function Testing ([facility])    [address] [place] [address]  3rd Floor  [facility] [address]   [phone]            Dec 18, 2018  8:00 AM CST   (Arrive by 7:45 AM)   RETURN CYSTIC FIBROSIS VISIT with Carole Ward MD   [facility] ([facility])    [address] [place] [address]  [address] [place] [id number] [phone]              Who to contact     If you have questions or need follow up information about today's clinic visit or your schedule please contact [facility] directly at [phone].  Normal or non-critical lab and imaging results will be communicated to you by MyChart, letter or phone within 4 business days after the clinic has received the results. If you do not hear from us within 7 days, please contact the clinic through MyChart or phone. If you have a critical or abnormal lab result, we will notify you by phone as soon as possible.  Submit refill requests through [employer] or call your pharmacy and they will forward the refill request to us. Please allow 3 business days for your refill  "to be completed.          Additional Information About Your Visit        [facility] Information     [employer] gives you secure access to your electronic health record. If you see a primary care provider, you can also send messages to your care team and make appointments. If you have questions, please call your primary care clinic.  If you do not have a primary care provider, please call [phone] and they will assist you.        Care EveryWhere ID     This is your Care EveryWhere ID. This could be used by other organizations to access your [facility] medical records  [id number]        Your Vitals Were     Pulse Temperature Respirations Height Pulse Oximetry BMI (Body Mass Index)    63 98.1  F (36.7  C) (Oral) 20 5' 8\" (1.727 m) 98% 30.2 kg/m2       Blood Pressure from Last 3 Encounters:   09/19/18 124/86   11/28/17 127/75   11/28/17 121/64    Weight from Last 3 Encounters:   09/19/18 198 lb 9.6 oz (90.1 kg)   11/28/17 194 lb 0.1 oz (88 kg)   11/28/17 193 lb 2 oz (87.6 kg)              We Performed the Following     OTOLARYNGOLOGY REFERRAL        Primary Care Provider Office Phone # Fax #    Boris Shabazz [phone]       [address] [address]        Equal Access to Services     PEDRO LUIS TAVERA : Hadii aad ku hadasho Soomaali, waaxda luqadaha, qaybta kaalmada adeegyada, gerald lopez. So [facility] [phone].    ATENCIÓN: Si habla español, tiene a galvez disposición servicios gratuitos de asistencia lingüística. Llame al [phone].    We comply with applicable federal civil rights laws and [place] laws. We do not discriminate on the basis of race, color, national origin, age, disability, sex, sexual orientation, or gender identity.            Thank you!     Thank you for choosing [facility]  for your care. Our goal is always to provide you with excellent care. Hearing back from our patients is one way we can " continue to improve our services. Please take a few minutes to complete the written survey that you may receive in the mail after your visit with us. Thank you!             Your Updated Medication List - Protect others around you: Learn how to safely use, store and throw away your medicines at www.disposemymeds.org.          This list is accurate as of 9/19/18  7:17 PM.  Always use your most recent med list.                   Brand Name Dispense Instructions for use Diagnosis    albuterol 108 (90 Base) MCG/ACT inhaler    PROAIR HFA/PROVENTIL HFA/VENTOLIN HFA    1 Inhaler    Inhale 2 puffs into the lungs every 6 hours as needed for shortness of breath / dyspnea or wheezing    Wheezing, Cystic fibrosis with pulmonary manifestations (H), Cystic fibrosis with pulmonary manifestations (H)       budesonide-formoterol 80-4.5 MCG/ACT Inhaler    SYMBICORT    1 Inhaler    Inhale 2 puffs into the lungs 2 times daily    CF (cystic fibrosis) (H)

## 2018-09-20 ASSESSMENT — ASTHMA QUESTIONNAIRES: ACT_TOTALSCORE: 24

## 2018-12-18 ENCOUNTER — OFFICE VISIT (OUTPATIENT)
Dept: PULMONOLOGY | Facility: CLINIC | Age: 38
End: 2018-12-18
Attending: INTERNAL MEDICINE
Payer: COMMERCIAL

## 2018-12-18 ENCOUNTER — ALLIED HEALTH/NURSE VISIT (OUTPATIENT)
Dept: CARE COORDINATION | Facility: CLINIC | Age: 38
End: 2018-12-18

## 2018-12-18 VITALS
HEIGHT: 68 IN | HEART RATE: 65 BPM | WEIGHT: 195 LBS | OXYGEN SATURATION: 98 % | DIASTOLIC BLOOD PRESSURE: 79 MMHG | RESPIRATION RATE: 16 BRPM | BODY MASS INDEX: 29.55 KG/M2 | SYSTOLIC BLOOD PRESSURE: 124 MMHG

## 2018-12-18 DIAGNOSIS — E84.0 CYSTIC FIBROSIS WITH PULMONARY MANIFESTATIONS (H): Primary | ICD-10-CM

## 2018-12-18 DIAGNOSIS — E84.9 CF (CYSTIC FIBROSIS) (H): Primary | ICD-10-CM

## 2018-12-18 DIAGNOSIS — E55.9 VITAMIN D DEFICIENCY: ICD-10-CM

## 2018-12-18 DIAGNOSIS — Z13.9 RISK AND FUNCTIONAL ASSESSMENT: Primary | ICD-10-CM

## 2018-12-18 LAB
EXPTIME-PRE: 9.88 SEC
FEF2575-%PRED-PRE: 71 %
FEF2575-PRE: 2.93 L/SEC
FEF2575-PRED: 4.11 L/SEC
FEFMAX-%PRED-PRE: 112 %
FEFMAX-PRE: 11.04 L/SEC
FEFMAX-PRED: 9.84 L/SEC
FEV1-%PRED-PRE: 90 %
FEV1-PRE: 3.7 L
FEV1FEV6-PRE: 77 %
FEV1FEV6-PRED: 82 %
FEV1FVC-PRE: 76 %
FEV1FVC-PRED: 81 %
FIFMAX-PRE: 8.73 L/SEC
FVC-%PRED-PRE: 96 %
FVC-PRE: 4.86 L
FVC-PRED: 5.01 L

## 2018-12-18 PROCEDURE — 97803 MED NUTRITION INDIV SUBSEQ: CPT | Mod: ZF | Performed by: DIETITIAN, REGISTERED

## 2018-12-18 PROCEDURE — G0463 HOSPITAL OUTPT CLINIC VISIT: HCPCS | Mod: ZF

## 2018-12-18 PROCEDURE — 87077 CULTURE AEROBIC IDENTIFY: CPT | Performed by: INTERNAL MEDICINE

## 2018-12-18 PROCEDURE — 87070 CULTURE OTHR SPECIMN AEROBIC: CPT | Performed by: INTERNAL MEDICINE

## 2018-12-18 PROCEDURE — 87186 SC STD MICRODIL/AGAR DIL: CPT | Performed by: INTERNAL MEDICINE

## 2018-12-18 SDOH — HEALTH STABILITY: MENTAL HEALTH: HOW OFTEN DO YOU HAVE A DRINK CONTAINING ALCOHOL?: MONTHLY OR LESS

## 2018-12-18 ASSESSMENT — PAIN SCALES - GENERAL: PAINLEVEL: NO PAIN (0)

## 2018-12-18 ASSESSMENT — ANXIETY QUESTIONNAIRES
IF YOU CHECKED OFF ANY PROBLEMS ON THIS QUESTIONNAIRE, HOW DIFFICULT HAVE THESE PROBLEMS MADE IT FOR YOU TO DO YOUR WORK, TAKE CARE OF THINGS AT HOME, OR GET ALONG WITH OTHER PEOPLE: NOT DIFFICULT AT ALL
3. WORRYING TOO MUCH ABOUT DIFFERENT THINGS: NOT AT ALL
7. FEELING AFRAID AS IF SOMETHING AWFUL MIGHT HAPPEN: NOT AT ALL
6. BECOMING EASILY ANNOYED OR IRRITABLE: NOT AT ALL
GAD7 TOTAL SCORE: 0
1. FEELING NERVOUS, ANXIOUS, OR ON EDGE: NOT AT ALL
2. NOT BEING ABLE TO STOP OR CONTROL WORRYING: NOT AT ALL
5. BEING SO RESTLESS THAT IT IS HARD TO SIT STILL: NOT AT ALL

## 2018-12-18 ASSESSMENT — PATIENT HEALTH QUESTIONNAIRE - PHQ9
SUM OF ALL RESPONSES TO PHQ QUESTIONS 1-9: 0
5. POOR APPETITE OR OVEREATING: NOT AT ALL

## 2018-12-18 ASSESSMENT — MIFFLIN-ST. JEOR: SCORE: 1788.88

## 2018-12-18 NOTE — LETTER
12/18/2018       RE: Mitesh Tovar  [address] [place] [address]     Dear Colleague,    Thank you for referring your patient, Mitesh Tovar, to the [facility] at [facility] Please see a copy of my visit note below.        [facility]  December 18, 2018         Assessment and Plan:   Mitesh Tovar is a 38 year old male with cystic fibrosis.    1. 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  He was able to run a marathon in less than 4 hours.  He does report however if he tries to do any kind of cardio without doing his albuterol that it is very difficult for him.  He also remains very consistent with the use of his Symbicort in the morning but sometimes this is evening dosed.  He does think that in general they have been useful inhalers for him.  Mitesh does continue to deny other pulmonary symptoms.  He denies any cough or sputum production except for minimal amounts sometimes in the morning.  He has excellent activity tolerance.  Mitesh historically has not shown anything on culture.  His last culture done a year ago was normal ariel.  The one previous to this was 2015, which also was normal ariel.  He does not produce sputum to get other cultures consistently.  At this time, I would recommend:   -- Mitesh continue on his Symbicort inhaler therapy.  In the past we have tried to wean this off and he did not tolerate.   -- Continue on the albuterol inhaler p.r.n. and in particular pre-exercise.     2.  CFTR modulator therapy.  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  They are felt to be consistent with a diagnosis of cystic fibrosis or CFTR related disorder.  These mutations are make him a candidate for CFTR modulator therapy.  I have discussed this with Mitesh in the past  and he has been hesitant to pursue this treatment given his overall wellness.  We will continue to discuss that when I see him.  I would agree with him that there is no compelling reason to initiate therapy at this time.     3.  Low bone mineral density.  Mitesh did have a DEXA scan at his last visit.  It did show all of his Z scores being negative.  We did discuss starting vitamin D, which he did for a couple weeks and then stopped after his last visit.  I once again encouraged him to begin calcium with vitamin D in hopes of maintaining better bone health.     4.  Health care maintenance.  Mitesh is due for annual studies, which we will perform in a few months' time.  I would also like to see him again at that time and I would give further consideration to modulator therapy.     5.  Psychosocial.  Mitesh is  and does continue to work as an .  He reports in general that he is feeling well.     6. Pancreatic sufficiency:  The patient has no new symptoms consistent with malabsorption.    - the patient is not on pancreatic enzymes  - encourage vitamin supplementation.    HCM: 3/2019  Immunizations: Declines flu shot  Colonoscopy: NA    Carole Ward MD MPH  Associate Professor of Medicine  Pulmonary, Allergy, Critical Care and Sleep Medicine      Interval History:     Mitesh reports that he does not cough.  He has no sputum, except for minimal amount in the morning sometimes.  He says at that time it is gray in color.  He denies any difficulty with activity tolerance.  He says if he does not use his albuterol before significant activity he does get a burning in his chest.          Review of Systems:     CONSTITUTIONAL: no fever, no chills, no change in weight, no change in energy, no change in appetite    INTEGUMENTARY/SKIN: no rash, no obvious new lesions    ENT/MOUTH: no sore throat, no new sinus pain, no new nasal drainage, recent ear pain and plugging relieved with removal of cerumen      RESPIRATORY: see interval history    CV: no chest pain, no palpitations, no peripheral edema, no orthopnea, no PND    GI: no nausea, no vomiting, no change in stools, no fatty stools, no GERD, no abdominal pain    : no dysuria, no urinary frequency    MUSCULOSKELETAL: no myalgias, no arthralgias    ENDOCRINE: no excessive thirst    NEURO:  No headache, no numbness, no tingling    SLEEP: no issues    PSYCHIATRIC: mood stable          Past Medical and Surgical History:     Past Medical History:   Diagnosis Date     Bilateral external ear infections     frequent as a child     Congenital absence of vas deferens      Pneumonia     x 2, during college years     Seasonal allergies      Past Surgical History:   Procedure Laterality Date     MYRINGOTOMY, INSERT TUBE BILATERAL, COMBINED  1985, 1983           Family History:     Family History   Problem Relation Age of Onset     Genitourinary Problems Brother         CBAVD, no CF testing     Hypertension Mother      Hypertension Father      Diabetes Father      Cerebrovascular Disease Maternal Grandmother      Cancer Paternal Grandfather         type unknown            Social History:     Social History     Socioeconomic History     Marital status:      Spouse name: Not on file     Number of children: Not on file     Years of education: Not on file     Highest education level: Not on file   Social Needs     Financial resource strain: Not on file     Food insecurity - worry: Not on file     Food insecurity - inability: Not on file     Transportation needs - medical: Not on file     Transportation needs - non-medical: Not on file   Occupational History     Occupation:    Tobacco Use     Smoking status: Never Smoker     Smokeless tobacco: Never Used   Substance and Sexual Activity     Alcohol use: Yes     Alcohol/week: 0.0 oz     Frequency: Monthly or less     Drug use: No     Sexual activity: Yes     Partners: Female     Birth control/protection: Pill  "  Other Topics Concern     Not on file   Social History Narrative    Patient lives with his wife in a house in [place], [place].  He works full time as an .  He has minimal alcohol consumption.  No tobacco exposure.  He has no pets.  Training for a Duathlon (run, bike, run).            Medications:     Current Outpatient Medications   Medication     albuterol (PROAIR HFA/PROVENTIL HFA/VENTOLIN HFA) 108 (90 BASE) MCG/ACT Inhaler     budesonide-formoterol (SYMBICORT) 80-4.5 MCG/ACT Inhaler     Calcium Citrate-Vitamin D (CITRACAL + D) 250-200 MG-UNIT TABS     cholecalciferol (VITAMIN D3) 1000 units (25 mcg) capsule     No current facility-administered medications for this visit.             Physical Exam:   /79 (BP Location: Right arm, Patient Position: Chair, Cuff Size: Adult Regular)   Pulse 65   Resp 16   Ht 1.727 m (5' 7.99\")   Wt 88.5 kg (195 lb)   SpO2 98%   BMI 29.66 kg/m       Constitutional:   Awake, alert and in no apparent distress     Eyes:   nonicteric     ENT:   oral mucosa moist without lesions, normal tm bilaterally, bilateral mucosal edema     Neck:   Supple without supraclavicular or cervical lymphadenopathy     Lungs:   Good air flow.  No crackles. No rhonchi.  No wheezes.     Cardiovascular:   Normal S1 and S2.  RRR.  No murmur, gallop or rub.     Abdomen:   NABS, soft, nontender, nondistended.       Musculoskeletal:   No edema, no digital clubbing present     Neurologic:   Alert and conversant.     Skin:   Warm, dry.  No rash on limited exam.             Data:   All laboratory and imaging data reviewed.    Cystic Fibrosis Culture  Specimen Description   Date Value Ref Range Status   12/18/2018 Throat  Final   11/28/2017 Throat  Final   11/17/2015 Throat  Final    Culture Micro   Date Value Ref Range Status   12/18/2018 Moderate growth  Normal ariel to date    Preliminary   11/28/2017 Moderate growth  Normal ariel    Final   11/17/2015 Normal ariel  Final        Recent Results (from " the past 168 hour(s))   General PFT Lab (Please always keep checked)    Collection Time: 12/18/18  7:30 AM   Result Value Ref Range    FVC-Pred 5.01 L    FVC-Pre 4.86 L    FVC-%Pred-Pre 96 %    FEV1-Pre 3.70 L    FEV1-%Pred-Pre 90 %    FEV1FVC-Pred 81 %    FEV1FVC-Pre 76 %    FEFMax-Pred 9.84 L/sec    FEFMax-Pre 11.04 L/sec    FEFMax-%Pred-Pre 112 %    FEF2575-Pred 4.11 L/sec    FEF2575-Pre 2.93 L/sec    QPV5158-%Pred-Pre 71 %    ExpTime-Pre 9.88 sec    FIFMax-Pre 8.73 L/sec    FEV1FEV6-Pred 82 %    FEV1FEV6-Pre 77 %   Cystic Fibrosis Culture Aerob Bacterial    Collection Time: 12/18/18  7:54 AM   Result Value Ref Range    Specimen Description Throat     Culture Micro Moderate growth  Normal ariel to date          PFT: Mild obstructive lung disease.  When compared to 11/28/2018, the FEV1 and FVC have little change.      Pulmonary exacerbation: absent        CF Annual Nutrition Assessment    Reason for Assessment  Assessed during Dr. Carole Ward clinic r/t increased nutrition risk with diagnosis of CF per protocol.    Nutrition Significant PMH  Mild Lung Disease - normal lung function  Pancreatic Sufficient    Anthropometric Assessment  Height: 172.7 cm  IBW based on BMI 22 for females and 23 for males per CF Foundation recs: 69.1 kg  Today's Weight: 88.5 kg (actual weight)   %IBW: 128%  Body mass index is 29.66 kg/m .   Current weight is considered: Normal BMI and at CF goal    Wt Readings from Last 10 Encounters:   12/18/18 88.5 kg (195 lb)   09/19/18 90.1 kg (198 lb 9.6 oz)   11/28/17 88 kg (194 lb 0.1 oz)   11/28/17 87.6 kg (193 lb 2 oz)   11/22/16 88.5 kg (195 lb 1.7 oz)   11/17/15 89.7 kg (197 lb 12 oz)   11/18/14 95 kg (209 lb 7 oz)   06/03/14 97.2 kg (214 lb 4.6 oz)   03/03/14 91.7 kg (202 lb 2.6 oz)   02/03/14 91.7 kg (202 lb 2.6 oz)     Weight Status: Although technically overweight per BMI pt holds a large amount of lean body mass which likely skews BMI categorization.  Per DEXA done today his body  composition is 78% lean mass, 22% fat mass which is improved from his DEXA 3-4 years ago which showed a distribution closer to 70%/30%. Pt's wife is a  and helps him with diverse activities to maintain/promote muscle mass gains.        -Weight generally stable over the past year.      MALNUTRITION  Patient does not meet 2 of the criteria necessary for diagnosing malnutrition.    Diet History and Assessment  Diet Preferences/Allergies/Intolerances:  Regular    Intake Recall/Comments:  Tries to eat a healthy diet, tends to be heavier on carbs and protein when he is training for an event such as his recent marathon.  Tries to avoid excessive amounts of processed foods.  Average of 2-3 meals and a few snacks per day.      Calcium:  Intake is typically 0-1 servings per day, does not do any dairy beyond occasional cheese  Salt: Adequate, pt reports no sx of deficiency  Hydration:  Not discussed this visit  Supplements: None currently, will use some when he is training for a race    Estimated Energy and Protein Needs  Estimation based on weight/lean body mass maintenance with Mild lung disease and pancreatic sufficient.    1648-3011 kcals/day =  25-30 kcals/kg   g protein/day = 1-1.3 g/kg    Laboratory Assessment    Vitamin A   Date Value Ref Range Status   11/28/2017 0.59 0.30 - 1.20 mg/L Final     Vitamin D Deficiency screening   Date Value Ref Range Status   11/28/2017 18 (L) 20 - 75 ug/L Final     Comment:     Season, race, dietary intake, and treatment affect the concentration of   25-hydroxy-Vitamin D. Values may decrease during winter months and increase   during summer months. Values 20-29 ug/L may indicate Vitamin D insufficiency   and values <20 ug/L may indicate Vitamin D deficiency.  Vitamin D determination is routinely performed by an immunoassay specific for   25 hydroxyvitamin D3.  If an individual is on vitamin D2 (ergocalciferol)   supplementation, please specify 25 OH vitamin D2 and  D3 level determination by   LCMSMS test [id number].       Vitamin E   Date Value Ref Range Status   11/28/2017 10.3 5.5 - 18.0 mg/L Final     Comment:     (Note)  Test developed and characteristics determined by [facility]. See Compliance Statement B: [email or web address]       Iron   Date Value Ref Range Status   11/28/2017 49 35 - 180 ug/dL Final     Cholesterol   Date Value Ref Range Status   11/28/2017 190 <200 mg/dL Final     Triglycerides   Date Value Ref Range Status   11/28/2017 144 <150 mg/dL Final     HDL Cholesterol   Date Value Ref Range Status   11/28/2017 53 >39 mg/dL Final     LDL Cholesterol Calculated   Date Value Ref Range Status   11/28/2017 107 (H) <100 mg/dL Final     Comment:     Above desirable:  100-129 mg/dl  Borderline High:  130-159 mg/dL  High:             160-189 mg/dL  Very high:       >189 mg/dl       VLDL-Cholesterol   Date Value Ref Range Status   02/03/2014 25 0 - 30 mg/dL Final     Cholesterol/HDL Ratio   Date Value Ref Range Status   02/03/2014 3.9 0.0 - 5.0 Final     *Vitamin A/E levels pending for 11/28 lab draw.     DEXA: (11/28/17) Bone density showing less than normal for age.   OGTT: (11/28/17) WNL    Current Vitamin/Mineral Supplements: (none)      NUTRITION DIAGNOSIS  Potential for increased nutrient needs r/t CF lung disease and high level of physical activity, LBM status.    INTERVENTIONS/RECOMMENDATIONS  1) Oral Intake/Weight:  Encouraged ongoing balanced diet focusing on good sources of protein, fiber and fluids.  Discussed weight and maintaining muscle mass with physical activity and diet.   2) Vitamin/Mineral Supplementation: Last vitamin D level low along with suboptimal DEXA results, unfortunately pt did not continue his prescribed vitamin D supplement last year after starting.  Has eliminated a lot of dairy from his diet as well so very low dietary calcium intake.  Starting twice daily Citracal + D along with an added 1000 units cholecalciferol per day.  Ordered to  [facility] to be covered under the [employer].  Mail order and refill process explained to pt, he will also receive a call from [facility] prior to his first shipment.      -Overdue for annual studies, will do these in March at his next appt. Ordered today by provider.     Patient Understanding: Good  Expected Compliance: Good  Follow-Up Plans: Per protocol    GOALS:  Begin calcium and vitamin D supplementation as prescribed within the next 2 weeks.      FOLLOW-UP/MONITORING:  Visit patient within 12 month(s) for annual review, check annual study results after they are completed in 2019.     Time Spent In Face-to-Face Patient Interactions: 15 minutes      Leta Escalona MS, RD, LD (pager [phone])  Cystic Fibrosis/Lung Transplant Dietitian      -Available Mon-Thurs 8 AM-4:30 PM. On Fridays please contact pager [phone] (Zee Burns RD) and on weekends/holidays contact coverage dietitian at pager [phone] (inpatient use only).   Again, thank you for allowing me to participate in the care of your patient.      Sincerely,    Carole Ward MD

## 2018-12-18 NOTE — PATIENT INSTRUCTIONS
Cystic Fibrosis Self-Care Plan    RECOMMENDATIONS:   Annual studies labs in February or March.  Follow following.  Start vitamin d and calcium.    YOUR GOAL:  Enjoy the Holidays!!      CF Nurse line:          Nikky Recinos   [phone]            CF Resp Therapist: Nohemy Colmenares            [phone]   CF Dietitians:           Zee Burns            [phone]                       Leta Escalona                       [phone]   CF Diabetes Nurse: Belinda Kelly             [phone]    CF Social Workers:  Kristen Hughes             [phone]                Elsa Marshall            [phone]  CF Pharmacist:        Luz Maria Gonzalez                              [phone]  [email or web address]         MRN: [id number]   Clinic Date: December 18, 2018   Patient: Mitesh Tovar     Annual Studies:   IGG   Date Value Ref Range Status   11/28/2017 912 695 - 1,620 mg/dL Final     Insulin   Date Value Ref Range Status   11/28/2017 20.7 3 - 25 mU/L Final     Comment:     Starting 7/13/2017, insulin results will decrease by approximately 37%   compared to insulin results reported in EPIC between (12/16/2016-7/12/2017),   and should be interpreted accordingly. Insulin results reported prior to   12/16/16 are comparable to current insulin results and therefore no adjustment   is needed.       There are no preventive care reminders to display for this patient.      Pulmonary Function Tests  FEV1: amount of air you can blow out in 1 second  FVC: total amount of air you can take in and blow out    Your Goals:         PFT Latest Ref Rng & Units 12/18/2018   FVC L 4.86   FEV1 L 3.70   FVC% % 96   FEV1% % 90          Airway Clearance: The Most Important Way to Keep Your Lungs Healthy  Vest Settings:    Hill-Rom Frequencies: 8, 9, 10 Pressure 10 Then, Frequencies 18, 19, 20 Pressure 6      RespirTech: Quick Start with Pressure of     Do each frequency for 5 minutes; Deflate vest after each frequency &  cough 3 times before beginning the next setting.        Good Nutrition Can Improve Lung Function and Overall Health     Take ALL of your vitamins with food     Take 1/2 of your enzymes before EVERY meal/snack and the other 1/2 mid-meal/snack    Wt Readings from Last 3 Encounters:   12/18/18 88.5 kg (195 lb)   09/19/18 90.1 kg (198 lb 9.6 oz)   11/28/17 88 kg (194 lb 0.1 oz)       Body mass index is 29.66 kg/m .         National CF Foundation Recommendations for BMI in CF Adults: Women: at least 22 Men: at least 23        Controlling Blood Sugars Helps Prevent Lung Infections & Improves Nutrition  Test blood sugar:     In the morning before eating (goal is )     2 hours after a meal (goal is less than 150)     When pre-meal glucose is greater than 150 add correction     At bedtime (if less than 100 eat a snack with 15 grams of carbohydrates  Last A1C Results:   Hemoglobin A1C   Date Value Ref Range Status   11/28/2017 4.9 4.3 - 6.0 % Final         If diabetic, measure A1C every 6 months. Goal: Under 7%    Staying Healthy    Research:  If you are interested in learning about research opportunities or have questions, please contact the CF Research Team at [phone] or [email or web address].      CF Foundation:  Compass is a personalized resource service to help you with the insurance, financial, legal and other issues you are facing.  It's free, confidential and available to anyone with CF.  Ask your  for more information or contact Compass directly at 272-COMPASS (077-9027) or compass@cff.org, or learn more at cff.org/compass.                   Calcium and vitamin D supplements will be ordered for you from the [facility] - phone [phone].  Call for refills or you can set up text refills with them as well.

## 2018-12-18 NOTE — PROGRESS NOTES
[facility]  [facility]  December 18, 2018         Assessment and Plan:   Mitesh Tovar is a 38 year old male with cystic fibrosis.    1. 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  He was able to run a marathon in less than 4 hours.  He does report however if he tries to do any kind of cardio without doing his albuterol that it is very difficult for him.  He also remains very consistent with the use of his Symbicort in the morning but sometimes this is evening dosed.  He does think that in general they have been useful inhalers for him.  Mitesh does continue to deny other pulmonary symptoms.  He denies any cough or sputum production except for minimal amounts sometimes in the morning.  He has excellent activity tolerance.  Mitesh historically has not shown anything on culture.  His last culture done a year ago was normal ariel.  The one previous to this was 2015, which also was normal ariel.  He does not produce sputum to get other cultures consistently.  At this time, I would recommend:   -- Mitesh continue on his Symbicort inhaler therapy.  In the past we have tried to wean this off and he did not tolerate.   -- Continue on the albuterol inhaler p.r.n. and in particular pre-exercise.     2.  CFTR modulator therapy.  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  They are felt to be consistent with a diagnosis of cystic fibrosis or CFTR related disorder.  These mutations are make him a candidate for CFTR modulator therapy.  I have discussed this with Mitesh in the past and he has been hesitant to pursue this treatment given his overall wellness.  We will continue to discuss that when I see him.  I would agree with him that there is no compelling reason to initiate therapy at this time.     3.  Low bone mineral density.  Mitesh did have a DEXA scan at his last visit.  It did  show all of his Z scores being negative.  We did discuss starting vitamin D, which he did for a couple weeks and then stopped after his last visit.  I once again encouraged him to begin calcium with vitamin D in hopes of maintaining better bone health.     4.  Health care maintenance.  Mitesh is due for annual studies, which we will perform in a few months' time.  I would also like to see him again at that time and I would give further consideration to modulator therapy.     5.  Psychosocial.  Mitesh is  and does continue to work as an .  He reports in general that he is feeling well.     6. Pancreatic sufficiency:  The patient has no new symptoms consistent with malabsorption.    - the patient is not on pancreatic enzymes  - encourage vitamin supplementation.    HCM: 3/2019  Immunizations: Declines flu shot  Colonoscopy: NA    Carole Ward MD MPH  Associate Professor of Medicine  Pulmonary, Allergy, Critical Care and Sleep Medicine      Interval History:     Mitesh reports that he does not cough.  He has no sputum, except for minimal amount in the morning sometimes.  He says at that time it is gray in color.  He denies any difficulty with activity tolerance.  He says if he does not use his albuterol before significant activity he does get a burning in his chest.          Review of Systems:     CONSTITUTIONAL: no fever, no chills, no change in weight, no change in energy, no change in appetite    INTEGUMENTARY/SKIN: no rash, no obvious new lesions    ENT/MOUTH: no sore throat, no new sinus pain, no new nasal drainage, recent ear pain and plugging relieved with removal of cerumen     RESPIRATORY: see interval history    CV: no chest pain, no palpitations, no peripheral edema, no orthopnea, no PND    GI: no nausea, no vomiting, no change in stools, no fatty stools, no GERD, no abdominal pain    : no dysuria, no urinary frequency    MUSCULOSKELETAL: no myalgias, no arthralgias    ENDOCRINE: no  excessive thirst    NEURO:  No headache, no numbness, no tingling    SLEEP: no issues    PSYCHIATRIC: mood stable          Past Medical and Surgical History:     Past Medical History:   Diagnosis Date     Bilateral external ear infections     frequent as a child     Congenital absence of vas deferens      Pneumonia     x 2, during college years     Seasonal allergies      Past Surgical History:   Procedure Laterality Date     MYRINGOTOMY, INSERT TUBE BILATERAL, COMBINED  1985, 1983           Family History:     Family History   Problem Relation Age of Onset     Genitourinary Problems Brother         CBAVD, no CF testing     Hypertension Mother      Hypertension Father      Diabetes Father      Cerebrovascular Disease Maternal Grandmother      Cancer Paternal Grandfather         type unknown            Social History:     Social History     Socioeconomic History     Marital status:      Spouse name: Not on file     Number of children: Not on file     Years of education: Not on file     Highest education level: Not on file   Social Needs     Financial resource strain: Not on file     Food insecurity - worry: Not on file     Food insecurity - inability: Not on file     Transportation needs - medical: Not on file     Transportation needs - non-medical: Not on file   Occupational History     Occupation:    Tobacco Use     Smoking status: Never Smoker     Smokeless tobacco: Never Used   Substance and Sexual Activity     Alcohol use: Yes     Alcohol/week: 0.0 oz     Frequency: Monthly or less     Drug use: No     Sexual activity: Yes     Partners: Female     Birth control/protection: Pill   Other Topics Concern     Not on file   Social History Narrative    Patient lives with his wife in a house in [place], [place].  He works full time as an .  He has minimal alcohol consumption.  No tobacco exposure.  He has no pets.  Training for a Duathlon (run, bike, run).            Medications:     Current  "Outpatient Medications   Medication     albuterol (PROAIR HFA/PROVENTIL HFA/VENTOLIN HFA) 108 (90 BASE) MCG/ACT Inhaler     budesonide-formoterol (SYMBICORT) 80-4.5 MCG/ACT Inhaler     Calcium Citrate-Vitamin D (CITRACAL + D) 250-200 MG-UNIT TABS     cholecalciferol (VITAMIN D3) 1000 units (25 mcg) capsule     No current facility-administered medications for this visit.             Physical Exam:   /79 (BP Location: Right arm, Patient Position: Chair, Cuff Size: Adult Regular)   Pulse 65   Resp 16   Ht 1.727 m (5' 7.99\")   Wt 88.5 kg (195 lb)   SpO2 98%   BMI 29.66 kg/m      Constitutional:   Awake, alert and in no apparent distress     Eyes:   nonicteric     ENT:   oral mucosa moist without lesions, normal tm bilaterally, bilateral mucosal edema     Neck:   Supple without supraclavicular or cervical lymphadenopathy     Lungs:   Good air flow.  No crackles. No rhonchi.  No wheezes.     Cardiovascular:   Normal S1 and S2.  RRR.  No murmur, gallop or rub.     Abdomen:   NABS, soft, nontender, nondistended.       Musculoskeletal:   No edema, no digital clubbing present     Neurologic:   Alert and conversant.     Skin:   Warm, dry.  No rash on limited exam.             Data:   All laboratory and imaging data reviewed.    Cystic Fibrosis Culture  Specimen Description   Date Value Ref Range Status   12/18/2018 Throat  Final   11/28/2017 Throat  Final   11/17/2015 Throat  Final    Culture Micro   Date Value Ref Range Status   12/18/2018 Moderate growth  Normal ariel to date    Preliminary   11/28/2017 Moderate growth  Normal ariel    Final   11/17/2015 Normal ariel  Final        Recent Results (from the past 168 hour(s))   General PFT Lab (Please always keep checked)    Collection Time: 12/18/18  7:30 AM   Result Value Ref Range    FVC-Pred 5.01 L    FVC-Pre 4.86 L    FVC-%Pred-Pre 96 %    FEV1-Pre 3.70 L    FEV1-%Pred-Pre 90 %    FEV1FVC-Pred 81 %    FEV1FVC-Pre 76 %    FEFMax-Pred 9.84 L/sec    FEFMax-Pre " 11.04 L/sec    FEFMax-%Pred-Pre 112 %    FEF2575-Pred 4.11 L/sec    FEF2575-Pre 2.93 L/sec    YYT8555-%Pred-Pre 71 %    ExpTime-Pre 9.88 sec    FIFMax-Pre 8.73 L/sec    FEV1FEV6-Pred 82 %    FEV1FEV6-Pre 77 %   Cystic Fibrosis Culture Aerob Bacterial    Collection Time: 12/18/18  7:54 AM   Result Value Ref Range    Specimen Description Throat     Culture Micro Moderate growth  Normal ariel to date          PFT: Mild obstructive lung disease.  When compared to 11/28/2018, the FEV1 and FVC have little change.      Pulmonary exacerbation: absent

## 2018-12-18 NOTE — PROGRESS NOTES
Adult Cystic Fibrosis Program  Annual Psychosocial Assessment    Presenting Information:  Mitesh is a 38-year-old male with cystic fibrosis, presenting in CF clinic for a regular/yearly follow up with primary CF provider, Dr. Ward.  Met with Mitesh to complete an updated annual psychosocial assessment.     Living situation:  Mitesh lives with his wife Ashley in [place], [place].  This summer, they sold their home in [place] and bought their new home in [place].  They are enjoying their new home which has an association with community pool.  They do not have pets.  He denies any concerns about his living situation.      Family Constellation:  Mitesh was raised by his biological parents in [place].  He is the youngest child and has 4 sibling(s): 3 brothers and 1 sister.  He is  to Ashley and has one son: Rayshawn age 22 whom he adopted and is Ashley's biological son from a previous relationship.  He reports that Rayshawn is doing well and currently lives in [place] where attends [employer].  Rayshawn is going to school to be a teacher and will be graduating next year.  He plans to move home with Mitesh and Ashley when he graduates for a while to save money and pay off some of his debt before finding a place of his own.  Mitesh's parents continue to live in [place] are in good health overall, he adds that his parents are in their 70's, his dad is 78 years old and has some arthritis. His [place].     Social Support:  Mitesh reports good social support.  He gets along well with family members and draws additional support from friends.  He describes his wife Ashley and son Rayshawn as very supportive.  He reports having a very close family and has a group of high school friends that are still close.   He has a brother who also has CBAVD but he does not believe that he has been seen for a CF work-up although he has encouraged him to, otherwise he does not have any connections  "to the CF community.  He reports that his Rastafarian sebastian is an important part of his life.      Adjustment to Illness:  Mitesh  was diagnosed with CF during a fertility work-up, at the age of 32 (2013).  He reports that this encounter was not a very good experience as he felt the Urologist was not sensitive when he delivered the information.  He felt that after he told this doctor that he was not interested in IVF due to his Yazidi beliefs he eric that he was judged and not understood.  He reports no medical complications while growing up other than frequent ear infections and mild asthma symptoms.  He participated in many sports growing up including baseball and wrestling, he tried to play baseball in college but reports that \"it didn't work out\".    Mitesh describes his current health status as \"good\".  Clinically, he has mild lung disease with normal spirometry.  He uses an inhaler prior to exercise and does not do bronchial drainage therapy, he does not own a vest.  He was offered to initiate Ivacaftor but declined to.  He gets exercise through running, curling and golfing.  He ran [employer] again this past summer.  He denies any health problems that interfere with activities of daily living.     Mitesh is currently seen yearly.          Health Care Directive:  Mitesh has not previously received Health Care Directive education.  This SW provided/reviewed education, including concept/purpose of health care directive, default health care agents and how to complete a directive.   Mitesh is comfortable with default health care agent(s) (his wife-Ashley) in absence of a directive. He is not currently interested in reviewing a health care directive.      Education:  Mitesh completed high school, he graduated from [employer].  He attended the [employer] and earned a degree in Accounting.      Employment:  Mitesh is employed full-time as an .  He reports a supportive work " "environment and denies any employment concerns.  He is benefits eligible for health, short and long-term disability.  Mitesh s spouse (Ashley) is employed as a .    Finances:  Mitesh and Ashley receive income from wages and denies any financial concerns.     Insurance:  Mitesh is insured through employer-based health insurance through [employer].  He denies any insurance concerns.      Mental Health/Coping:  Mitesh denies any current or past symptoms indicative of mood, anxiety, eating, learning or other mental health disorder.  Today he describes his mood as \"good\".  Mitesh reports generally low stress levels.  Mitesh identifies Mandaen sebastian as important in his life.    Mitesh completed the following screens today:  PHQ-9: Score 0: Indicating no symptoms of depression and described as not difficult at all in functioning.  ROLLY-7: Score 0: Indicating no symptoms of anxiety and described as not difficult at all in functioning.      Mitesh agreed with the scoring above and denied any symptoms not covered on these screens.    Chemical Health:  Mitesh denies tobacco or illicit drug use.  He is not exposed to secondhand smoke.  He reports that he does not drink much alcohol, adding that he does not tolerate it well and so he chooses to mostly avoid it although did drink more often in college.      Leisure Activities/Interests:   Mitesh enjoys golfing, running and is in a curling club.      Intervention:  -Psychosocial Assessment  -Mental Health Screening    Assessment:  Mitesh appeared to be open in his responses.  He remains stable from a physical health and psychosocial perspective with access to relevant resources and supports.  He comes to clinic yearly.  No concerns expressed/noted.      Plan:  Re-consult for any psychosocial needs that may arise.    Complete psychosocial assessment annually.    TRAN Guevara, [facility]  Adult Cystic Fibrosis   Ph: [phone]  Pager: [phone]          "

## 2018-12-18 NOTE — PROGRESS NOTES
CF Annual Nutrition Assessment    Reason for Assessment  Assessed during Dr. Carole Ward clinic r/t increased nutrition risk with diagnosis of CF per protocol.    Nutrition Significant PMH  Mild Lung Disease - normal lung function  Pancreatic Sufficient    Anthropometric Assessment  Height: 172.7 cm  IBW based on BMI 22 for females and 23 for males per CF Foundation recs: 69.1 kg  Today's Weight: 88.5 kg (actual weight)   %IBW: 128%  Body mass index is 29.66 kg/m .   Current weight is considered: Normal BMI and at CF goal    Wt Readings from Last 10 Encounters:   12/18/18 88.5 kg (195 lb)   09/19/18 90.1 kg (198 lb 9.6 oz)   11/28/17 88 kg (194 lb 0.1 oz)   11/28/17 87.6 kg (193 lb 2 oz)   11/22/16 88.5 kg (195 lb 1.7 oz)   11/17/15 89.7 kg (197 lb 12 oz)   11/18/14 95 kg (209 lb 7 oz)   06/03/14 97.2 kg (214 lb 4.6 oz)   03/03/14 91.7 kg (202 lb 2.6 oz)   02/03/14 91.7 kg (202 lb 2.6 oz)     Weight Status: Although technically overweight per BMI pt holds a large amount of lean body mass which likely skews BMI categorization.  Per DEXA done today his body composition is 78% lean mass, 22% fat mass which is improved from his DEXA 3-4 years ago which showed a distribution closer to 70%/30%. Pt's wife is a  and helps him with diverse activities to maintain/promote muscle mass gains.        -Weight generally stable over the past year.      MALNUTRITION  Patient does not meet 2 of the criteria necessary for diagnosing malnutrition.    Diet History and Assessment  Diet Preferences/Allergies/Intolerances:  Regular    Intake Recall/Comments:  Tries to eat a healthy diet, tends to be heavier on carbs and protein when he is training for an event such as his recent marathon.  Tries to avoid excessive amounts of processed foods.  Average of 2-3 meals and a few snacks per day.      Calcium:  Intake is typically 0-1 servings per day, does not do any dairy beyond occasional cheese  Salt: Adequate, pt reports no  sx of deficiency  Hydration:  Not discussed this visit  Supplements: None currently, will use some when he is training for a race    Estimated Energy and Protein Needs  Estimation based on weight/lean body mass maintenance with Mild lung disease and pancreatic sufficient.    [id number] kcals/day =  25-30 kcals/kg   g protein/day = 1-1.3 g/kg    Laboratory Assessment    Vitamin A   Date Value Ref Range Status   11/28/2017 0.59 0.30 - 1.20 mg/L Final     Vitamin D Deficiency screening   Date Value Ref Range Status   11/28/2017 18 (L) 20 - 75 ug/L Final     Comment:     Season, race, dietary intake, and treatment affect the concentration of   25-hydroxy-Vitamin D. Values may decrease during winter months and increase   during summer months. Values 20-29 ug/L may indicate Vitamin D insufficiency   and values <20 ug/L may indicate Vitamin D deficiency.  Vitamin D determination is routinely performed by an immunoassay specific for   25 hydroxyvitamin D3.  If an individual is on vitamin D2 (ergocalciferol)   supplementation, please specify 25 OH vitamin D2 and D3 level determination by   LCMSMS test VITD23.       Vitamin E   Date Value Ref Range Status   11/28/2017 10.3 5.5 - 18.0 mg/L Final     Comment:     (Note)  Test developed and characteristics determined by [employer]. See Compliance Statement B: [email or web address]       Iron   Date Value Ref Range Status   11/28/2017 49 35 - 180 ug/dL Final     Cholesterol   Date Value Ref Range Status   11/28/2017 190 <200 mg/dL Final     Triglycerides   Date Value Ref Range Status   11/28/2017 144 <150 mg/dL Final     HDL Cholesterol   Date Value Ref Range Status   11/28/2017 53 >39 mg/dL Final     LDL Cholesterol Calculated   Date Value Ref Range Status   11/28/2017 107 (H) <100 mg/dL Final     Comment:     Above desirable:  100-129 mg/dl  Borderline High:  130-159 mg/dL  High:             160-189 mg/dL  Very high:       >189 mg/dl       VLDL-Cholesterol   Date Value  Ref Range Status   02/03/2014 25 0 - 30 mg/dL Final     Cholesterol/HDL Ratio   Date Value Ref Range Status   02/03/2014 3.9 0.0 - 5.0 Final     *Vitamin A/E levels pending for 11/28 lab draw.     DEXA: (11/28/17) Bone density showing less than normal for age.   OGTT: (11/28/17) WNL    Current Vitamin/Mineral Supplements: (none)      NUTRITION DIAGNOSIS  Potential for increased nutrient needs r/t CF lung disease and high level of physical activity, LBM status.    INTERVENTIONS/RECOMMENDATIONS  1) Oral Intake/Weight:  Encouraged ongoing balanced diet focusing on good sources of protein, fiber and fluids.  Discussed weight and maintaining muscle mass with physical activity and diet.   2) Vitamin/Mineral Supplementation: Last vitamin D level low along with suboptimal DEXA results, unfortunately pt did not continue his prescribed vitamin D supplement last year after starting.  Has eliminated a lot of dairy from his diet as well so very low dietary calcium intake.  Starting twice daily Citracal + D along with an added 1000 units cholecalciferol per day.  Ordered to [facility] to be covered under the CF Vitamin Fund.  Mail order and refill process explained to pt, he will also receive a call from [facility] prior to his first shipment.      -Overdue for annual studies, will do these in March at his next appt. Ordered today by provider.     Patient Understanding: Good  Expected Compliance: Good  Follow-Up Plans: Per protocol    GOALS:  Begin calcium and vitamin D supplementation as prescribed within the next 2 weeks.      FOLLOW-UP/MONITORING:  Visit patient within 12 month(s) for annual review, check annual study results after they are completed in 2019.     Time Spent In Face-to-Face Patient Interactions: 15 minutes      Leta Escalona MS, RD, LD (pager [phone])  Cystic Fibrosis/Lung Transplant Dietitian      -Available Mon-Thurs 8 AM-4:30 PM. On Fridays please contact pager [phone] (Zee Burns RD) and on weekends/holidays contact  coverage dietitian at pager [phone] (inpatient use only).

## 2018-12-19 ASSESSMENT — ANXIETY QUESTIONNAIRES: GAD7 TOTAL SCORE: 0

## 2018-12-23 LAB
BACTERIA SPEC CULT: ABNORMAL
BACTERIA SPEC CULT: ABNORMAL
SPECIMEN SOURCE: ABNORMAL

## 2019-01-08 DIAGNOSIS — E84.0 CYSTIC FIBROSIS WITH PULMONARY MANIFESTATIONS (H): Primary | Chronic | ICD-10-CM

## 2019-01-08 RX ORDER — AMOXICILLIN AND CLAVULANATE POTASSIUM 500; 125 MG/1; MG/1
1 TABLET, FILM COATED ORAL 2 TIMES DAILY
Qty: 20 TABLET | Refills: 0 | Status: SHIPPED | OUTPATIENT
Start: 2019-01-08 | End: 2019-03-26

## 2019-03-12 ENCOUNTER — MYC REFILL (OUTPATIENT)
Dept: PULMONOLOGY | Facility: CLINIC | Age: 39
End: 2019-03-12

## 2019-03-12 DIAGNOSIS — E84.0 CYSTIC FIBROSIS WITH PULMONARY MANIFESTATIONS (H): ICD-10-CM

## 2019-03-12 DIAGNOSIS — R06.2 WHEEZING: ICD-10-CM

## 2019-03-12 DIAGNOSIS — E84.9 CF (CYSTIC FIBROSIS) (H): ICD-10-CM

## 2019-03-12 RX ORDER — BUDESONIDE AND FORMOTEROL FUMARATE DIHYDRATE 80; 4.5 UG/1; UG/1
2 AEROSOL RESPIRATORY (INHALATION) 2 TIMES DAILY
Qty: 1 INHALER | Refills: 11 | Status: SHIPPED | OUTPATIENT
Start: 2019-03-12 | End: 2020-03-16

## 2019-03-12 RX ORDER — ALBUTEROL SULFATE 90 UG/1
2 AEROSOL, METERED RESPIRATORY (INHALATION) EVERY 6 HOURS PRN
Qty: 1 INHALER | Refills: 11 | Status: SHIPPED | OUTPATIENT
Start: 2019-03-12 | End: 2020-03-19

## 2019-03-26 ENCOUNTER — OFFICE VISIT (OUTPATIENT)
Dept: PULMONOLOGY | Facility: CLINIC | Age: 39
End: 2019-03-26
Attending: INTERNAL MEDICINE
Payer: COMMERCIAL

## 2019-03-26 ENCOUNTER — OFFICE VISIT (OUTPATIENT)
Dept: PHARMACY | Facility: CLINIC | Age: 39
End: 2019-03-26
Payer: COMMERCIAL

## 2019-03-26 VITALS
OXYGEN SATURATION: 97 % | WEIGHT: 195.11 LBS | HEIGHT: 68 IN | BODY MASS INDEX: 29.57 KG/M2 | RESPIRATION RATE: 16 BRPM | DIASTOLIC BLOOD PRESSURE: 82 MMHG | HEART RATE: 69 BPM | SYSTOLIC BLOOD PRESSURE: 121 MMHG

## 2019-03-26 DIAGNOSIS — E84.0 CYSTIC FIBROSIS WITH PULMONARY MANIFESTATIONS (H): Primary | Chronic | ICD-10-CM

## 2019-03-26 DIAGNOSIS — E84.0 CYSTIC FIBROSIS WITH PULMONARY MANIFESTATIONS (H): ICD-10-CM

## 2019-03-26 DIAGNOSIS — Z78.9 TAKES DIETARY SUPPLEMENTS: ICD-10-CM

## 2019-03-26 DIAGNOSIS — E84.9 CF (CYSTIC FIBROSIS) (H): Primary | ICD-10-CM

## 2019-03-26 LAB
ALBUMIN SERPL-MCNC: 4.2 G/DL (ref 3.4–5)
ALBUMIN UR-MCNC: NEGATIVE MG/DL
ALP SERPL-CCNC: 85 U/L (ref 40–150)
ALT SERPL W P-5'-P-CCNC: 33 U/L (ref 0–70)
ANION GAP SERPL CALCULATED.3IONS-SCNC: 5 MMOL/L (ref 3–14)
APPEARANCE UR: CLEAR
AST SERPL W P-5'-P-CCNC: 24 U/L (ref 0–45)
BASOPHILS # BLD AUTO: 0.1 10E9/L (ref 0–0.2)
BASOPHILS NFR BLD AUTO: 0.7 %
BILIRUB UR QL STRIP: NEGATIVE
BUN SERPL-MCNC: 15 MG/DL (ref 7–30)
CALCIUM SERPL-MCNC: 9.1 MG/DL (ref 8.5–10.1)
CHLORIDE SERPL-SCNC: 104 MMOL/L (ref 94–109)
CHOLEST SERPL-MCNC: 208 MG/DL
CO2 SERPL-SCNC: 29 MMOL/L (ref 20–32)
COLOR UR AUTO: YELLOW
CREAT SERPL-MCNC: 0.96 MG/DL (ref 0.66–1.25)
CREAT UR-MCNC: 214 MG/DL
DEPRECATED CALCIDIOL+CALCIFEROL SERPL-MC: 30 UG/L (ref 20–75)
DIFFERENTIAL METHOD BLD: NORMAL
EOSINOPHIL # BLD AUTO: 0.6 10E9/L (ref 0–0.7)
EOSINOPHIL NFR BLD AUTO: 8.7 %
ERYTHROCYTE [DISTWIDTH] IN BLOOD BY AUTOMATED COUNT: 12 % (ref 10–15)
ERYTHROCYTE [SEDIMENTATION RATE] IN BLOOD BY WESTERGREN METHOD: 3 MM/H (ref 0–15)
EXPTIME-PRE: 6.92 SEC
FEF2575-%PRED-PRE: 75 %
FEF2575-PRE: 3.1 L/SEC
FEF2575-PRED: 4.11 L/SEC
FEFMAX-%PRED-PRE: 107 %
FEFMAX-PRE: 10.57 L/SEC
FEFMAX-PRED: 9.84 L/SEC
FEV1-%PRED-PRE: 87 %
FEV1-PRE: 3.6 L
FEV1FEV6-PRE: 79 %
FEV1FEV6-PRED: 82 %
FEV1FVC-PRE: 79 %
FEV1FVC-PRED: 82 %
FIFMAX-PRE: 9.37 L/SEC
FVC-%PRED-PRE: 91 %
FVC-PRE: 4.58 L
FVC-PRED: 5.01 L
GFR SERPL CREATININE-BSD FRML MDRD: >90 ML/MIN/{1.73_M2}
GGT SERPL-CCNC: 26 U/L (ref 0–75)
GLUCOSE SERPL-MCNC: 90 MG/DL (ref 70–99)
GLUCOSE UR STRIP-MCNC: NEGATIVE MG/DL
HBA1C MFR BLD: 5.5 % (ref 0–5.6)
HCT VFR BLD AUTO: 49.5 % (ref 40–53)
HDLC SERPL-MCNC: 53 MG/DL
HGB BLD-MCNC: 17.5 G/DL (ref 13.3–17.7)
HGB UR QL STRIP: NEGATIVE
IGA SERPL-MCNC: 320 MG/DL (ref 70–380)
IGG SERPL-MCNC: 1130 MG/DL (ref 695–1620)
IGM SERPL-MCNC: 64 MG/DL (ref 60–265)
IMM GRANULOCYTES # BLD: 0 10E9/L (ref 0–0.4)
IMM GRANULOCYTES NFR BLD: 0.1 %
INR PPP: 1.1 (ref 0.86–1.14)
IRON SERPL-MCNC: 101 UG/DL (ref 35–180)
KETONES UR STRIP-MCNC: NEGATIVE MG/DL
LDLC SERPL CALC-MCNC: 129 MG/DL
LEUKOCYTE ESTERASE UR QL STRIP: NEGATIVE
LYMPHOCYTES # BLD AUTO: 3.1 10E9/L (ref 0.8–5.3)
LYMPHOCYTES NFR BLD AUTO: 44.1 %
MAGNESIUM SERPL-MCNC: 2 MG/DL (ref 1.6–2.3)
MCH RBC QN AUTO: 31.5 PG (ref 26.5–33)
MCHC RBC AUTO-ENTMCNC: 35.4 G/DL (ref 31.5–36.5)
MCV RBC AUTO: 89 FL (ref 78–100)
MICROALBUMIN UR-MCNC: 7 MG/L
MICROALBUMIN/CREAT UR: 3.14 MG/G CR (ref 0–17)
MONOCYTES # BLD AUTO: 0.6 10E9/L (ref 0–1.3)
MONOCYTES NFR BLD AUTO: 8 %
MUCOUS THREADS #/AREA URNS LPF: PRESENT /LPF
NEUTROPHILS # BLD AUTO: 2.7 10E9/L (ref 1.6–8.3)
NEUTROPHILS NFR BLD AUTO: 38.4 %
NITRATE UR QL: NEGATIVE
NONHDLC SERPL-MCNC: 156 MG/DL
NRBC # BLD AUTO: 0 10*3/UL
NRBC BLD AUTO-RTO: 0 /100
PH UR STRIP: 5 PH (ref 5–7)
PHOSPHATE SERPL-MCNC: 3.2 MG/DL (ref 2.5–4.5)
PLATELET # BLD AUTO: 251 10E9/L (ref 150–450)
POTASSIUM SERPL-SCNC: 4 MMOL/L (ref 3.4–5.3)
PROT SERPL-MCNC: 7.6 G/DL (ref 6.8–8.8)
RBC # BLD AUTO: 5.56 10E12/L (ref 4.4–5.9)
RBC #/AREA URNS AUTO: <1 /HPF (ref 0–2)
SODIUM SERPL-SCNC: 138 MMOL/L (ref 133–144)
SOURCE: ABNORMAL
SP GR UR STRIP: 1.02 (ref 1–1.03)
SQUAMOUS #/AREA URNS AUTO: <1 /HPF (ref 0–1)
TRIGL SERPL-MCNC: 135 MG/DL
TSH SERPL DL<=0.005 MIU/L-ACNC: 1.71 MU/L (ref 0.4–4)
UROBILINOGEN UR STRIP-MCNC: 0 MG/DL (ref 0–2)
WBC # BLD AUTO: 7 10E9/L (ref 4–11)
WBC #/AREA URNS AUTO: 1 /HPF (ref 0–5)

## 2019-03-26 PROCEDURE — 87070 CULTURE OTHR SPECIMN AEROBIC: CPT | Performed by: INTERNAL MEDICINE

## 2019-03-26 PROCEDURE — 82784 ASSAY IGA/IGD/IGG/IGM EACH: CPT | Performed by: INTERNAL MEDICINE

## 2019-03-26 PROCEDURE — 81001 URINALYSIS AUTO W/SCOPE: CPT | Performed by: INTERNAL MEDICINE

## 2019-03-26 PROCEDURE — 82785 ASSAY OF IGE: CPT | Performed by: INTERNAL MEDICINE

## 2019-03-26 PROCEDURE — 83735 ASSAY OF MAGNESIUM: CPT | Performed by: INTERNAL MEDICINE

## 2019-03-26 PROCEDURE — 85652 RBC SED RATE AUTOMATED: CPT | Performed by: INTERNAL MEDICINE

## 2019-03-26 PROCEDURE — 82306 VITAMIN D 25 HYDROXY: CPT | Performed by: INTERNAL MEDICINE

## 2019-03-26 PROCEDURE — 84075 ASSAY ALKALINE PHOSPHATASE: CPT | Performed by: INTERNAL MEDICINE

## 2019-03-26 PROCEDURE — 82977 ASSAY OF GGT: CPT | Performed by: INTERNAL MEDICINE

## 2019-03-26 PROCEDURE — 84460 ALANINE AMINO (ALT) (SGPT): CPT | Performed by: INTERNAL MEDICINE

## 2019-03-26 PROCEDURE — 84403 ASSAY OF TOTAL TESTOSTERONE: CPT | Performed by: INTERNAL MEDICINE

## 2019-03-26 PROCEDURE — 84446 ASSAY OF VITAMIN E: CPT | Performed by: INTERNAL MEDICINE

## 2019-03-26 PROCEDURE — 83540 ASSAY OF IRON: CPT | Performed by: INTERNAL MEDICINE

## 2019-03-26 PROCEDURE — 84450 TRANSFERASE (AST) (SGOT): CPT | Performed by: INTERNAL MEDICINE

## 2019-03-26 PROCEDURE — 84590 ASSAY OF VITAMIN A: CPT | Performed by: INTERNAL MEDICINE

## 2019-03-26 PROCEDURE — 36415 COLL VENOUS BLD VENIPUNCTURE: CPT | Performed by: INTERNAL MEDICINE

## 2019-03-26 PROCEDURE — 85610 PROTHROMBIN TIME: CPT | Performed by: INTERNAL MEDICINE

## 2019-03-26 PROCEDURE — 99605 MTMS BY PHARM NP 15 MIN: CPT | Performed by: PHARMACIST

## 2019-03-26 PROCEDURE — 85025 COMPLETE CBC W/AUTO DIFF WBC: CPT | Performed by: INTERNAL MEDICINE

## 2019-03-26 PROCEDURE — 84443 ASSAY THYROID STIM HORMONE: CPT | Performed by: INTERNAL MEDICINE

## 2019-03-26 PROCEDURE — 84155 ASSAY OF PROTEIN SERUM: CPT | Performed by: INTERNAL MEDICINE

## 2019-03-26 PROCEDURE — 83036 HEMOGLOBIN GLYCOSYLATED A1C: CPT | Performed by: INTERNAL MEDICINE

## 2019-03-26 PROCEDURE — 80061 LIPID PANEL: CPT | Performed by: INTERNAL MEDICINE

## 2019-03-26 PROCEDURE — 82043 UR ALBUMIN QUANTITATIVE: CPT | Performed by: INTERNAL MEDICINE

## 2019-03-26 PROCEDURE — 87077 CULTURE AEROBIC IDENTIFY: CPT | Performed by: INTERNAL MEDICINE

## 2019-03-26 PROCEDURE — 80069 RENAL FUNCTION PANEL: CPT | Performed by: INTERNAL MEDICINE

## 2019-03-26 PROCEDURE — G0463 HOSPITAL OUTPT CLINIC VISIT: HCPCS | Mod: ZF

## 2019-03-26 ASSESSMENT — MIFFLIN-ST. JEOR: SCORE: 1789.37

## 2019-03-26 ASSESSMENT — PAIN SCALES - GENERAL: PAINLEVEL: NO PAIN (0)

## 2019-03-26 NOTE — PROGRESS NOTES
CF screen for PT needs    Symptoms of urinary incontinence: None  Exercise regimen (aerobic or resistance)/ daily activity: Bikes/runs 3x/wk with plans to increase to 5x/wk with warmer temperatures.  Each session lasts ~40min.  Has run 4 marathons and has history of consistent activity/exercsie.  Wife is a  and can advise patient on aerobic and strengthening ex.  Encouraged to increase resistance training, back exercises specifically.   CF related pain limiting participation in daily activities: none  Gross postural impairment: overall good posture with minor scapular protraction

## 2019-03-26 NOTE — NURSING NOTE
Chief Complaint   Patient presents with     RECHECK     Cystic Fibrosis   MIPS up-to-date on health maintenance   Preeti Wallace, LORRIE

## 2019-03-26 NOTE — PROGRESS NOTES
SUBJECTIVE/OBJECTIVE:                           Mitesh Tovar is a 38 year old male coming in for routine clinic visit.  His primary pulmonologist is Dr. Carole Ward.    Allergies/ADRs: Reviewed in Epic  Tobacco: No tobacco use  Alcohol: none  PMH: Reviewed in Epic  CF Genotype: [id number]    Medication Adherence  Medication adherence flowsheet 3/26/2019   Patient medication administration: Responsible for own medications   Patient estimated adherence level: %   Pharmacist assessment of adherence: Excellent   Patient reported doses missed per week: 0-1   Pharmacy MPR: Not available   Facilitators to medication adherence  Schedule/routine   Patient reported barriers to medication adherence  No issues identified    Mitesh takes his Symbicort 2 puffs twice daily every day.  His albuterol is just PRN.  He does report he has not taken calcium/vitamin d for quite some time because he is not able to swallow tabs. See below.       Medication Access  Medication access flowsheet 3/26/2019   Number of pharmacies used: 1   Pharmacy: No specialty pharmacy utilized   Enrolled in [facility]? No   Patient reported barriers to accessing medications: No issues reported by patient        CF  Inhaled medications   Bronchodilator: albuterol HFA  - uses PRN with exercise   Mucolytic: none  Antibiotic: none  Other: Symbicort 80/4.5 mcg 2 puffs BID  Oral medications   Azithromycin: not indicated  CFTR modulator: eligible for Kalydeco, may want to consider starting if clinical trend continues   Other: none  Pulmonary symptoms are worsened/increased  PFTs are decreased  Current FEV1 87  Cultures: throat cultures grow Staph  Current exacerbation: no    Calcium supplementation: Mitesh has not been taking his calcium and vitamin D because he is not able to swallow the tablets.  Went over options for chewable tabs, decided together on the VitaFusion calcium chews which contain 500mg calcium carbonate and 1,000 units  "of vitamin D per two gummies.  He will purchase these over the counter.      Lab Results   Component Value Date    VITDT 30 03/26/2019    VITDT 18 (L) 11/28/2017       PFTs:    Weight/BMI:  Estimated body mass index is 29.67 kg/m  as calculated from the following:    Height as of an earlier encounter on 3/26/19: 5' 7.99\" (1.727 m).    Weight as of an earlier encounter on 3/26/19: 195 lb 1.7 oz (88.5 kg).      ASSESSMENT:                             Current medications were reviewed today.     CF: Stable. Patient would benefit from considering starting CFTR modulator Kalydeco if clinical course remains the same.    Calcium supplementation: Needs Improvement.  Patient would benefit from the following medication changes: start taking VitaFusion chewable calcium/vitamin d      PLAN:                            Start taking VitaFusion calcium chews - try to do one chew with breakfast and one chew with dinner.     I spent 15 minutes with this patient today.      Will follow up in 1 year or sooner if needed.    The patient was provided a summary of these recommendations in the AVS from CF care team visit.    Kelsey Fairchild, Anny  CF Clinic Pharmacist  Phone: [phone]  E-mail: [email or web address]    "

## 2019-03-26 NOTE — PROGRESS NOTES
[facility]  [facility]  March 26, 2019         Assessment and Plan:   Mitesh Tovar is a 38 year old male with cystic fibrosis.    1. CF lung disease with mild obstruction:  Mitesh reports today that he is doing fairly well.  He has not been as consistent with the use of his Symbicort as he has been in the past.  He feels also that he is not working out as much, so he is using his albuterol less.  From a symptom point of view, he reports after his last visit he did contact us for an antibiotic course and had resolution of a cough that had occurred.  We did review Mitesh's pulmonary function studies, which are showing evidence of decline.  Additionally, his last sputum culture did show Staph aureus.  We did review some old imaging that did not show evidence of bronchiectasis.  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  At this time I have recommended:    -- That he reinitiate his Symbicort at least once daily.  We discussed doing it in the morning.   -- He should get back to doing exercise.   -- We will do short-term followup and if he continues to show evidence of decline in his pulmonary function, we would re-pursue imaging and the possibility of initiation of some form of bronchial drainage if necessary.     2.  CFTR modulator therapy.  Mitesh's genotype could be amendable to immunomodulator therapy.  In the past, we have been hesitant to pursue this treatment because of his wellness.  This is certainly a consideration for him in the future.     3.  Hypercholesterolemia.  Mitesh did show evidence of a high cholesterol and LDL.  I did ask Leta Escalona, our dietitian, to spend time with him today discussing dietary interventions.     4.  Health care maintenance.  Mitesh to get annual studies performed today.   I do await the results of the vitamin levels.     5.  Psychosocial.  Mitesh does continue to work full-time.  He is .  He reports that things are going well.     6. Pancreatic sufficiency:  The patient has no new symptoms consistent with worsening malabsorption.    - not on pancreatic enzymes  - continue vitamin supplementation--vitamin levels pending    HCM: done today  Immunizations: review at next visit  Colonoscopy: DILCIA Ward MD MPH  Associate Professor of Medicine  Pulmonary, Allergy, Critical Care and Sleep Medicine      Interval History:     Mitesh reports that he does occasionally produce a clear white sputum in the morning.  In general, he does not have a significant cough.  He does not do any form of bronchial drainage.  He has no trouble with activity tolerance.          Review of Systems:     CONSTITUTIONAL: no fever, no chills, no change in weight, no change in energy, no change in appetite    INTEGUMENTARY/SKIN: no rash, no obvious new lesions    ENT/MOUTH: no sore throat, no new sinus pain, no new nasal drainage     RESPIRATORY: see interval history    CV: no chest pain, no palpitations    GI: no nausea, no vomiting, no change in stools, no fatty stools, no GERD, no abdominal pain    : no dysuria, no urinary frequency    MUSCULOSKELETAL: no myalgias, no arthralgias    PSYCHIATRIC: mood stable          Past Medical and Surgical History:     Past Medical History:   Diagnosis Date     Bilateral external ear infections     frequent as a child     Congenital absence of vas deferens      Pneumonia     x 2, during college years     Seasonal allergies      Past Surgical History:   Procedure Laterality Date     MYRINGOTOMY, INSERT TUBE BILATERAL, COMBINED  1985, 1983           Family History:     Family History   Problem Relation Age of Onset     Genitourinary Problems Brother         CBAVD, no CF testing     Hypertension Mother      Hypertension Father      Diabetes Father       Cerebrovascular Disease Maternal Grandmother      Cancer Paternal Grandfather         type unknown            Social History:     Social History     Socioeconomic History     Marital status:      Spouse name: Not on file     Number of children: Not on file     Years of education: Not on file     Highest education level: Not on file   Occupational History     Occupation:    Social Needs     Financial resource strain: Not on file     Food insecurity:     Worry: Not on file     Inability: Not on file     Transportation needs:     Medical: Not on file     Non-medical: Not on file   Tobacco Use     Smoking status: Never Smoker     Smokeless tobacco: Never Used   Substance and Sexual Activity     Alcohol use: Yes     Alcohol/week: 0.0 oz     Frequency: Monthly or less     Drug use: No     Sexual activity: Yes     Partners: Female     Birth control/protection: Pill   Lifestyle     Physical activity:     Days per week: Not on file     Minutes per session: Not on file     Stress: Not on file   Relationships     Social connections:     Talks on phone: Not on file     Gets together: Not on file     Attends Evangelical service: Not on file     Active member of club or organization: Not on file     Attends meetings of clubs or organizations: Not on file     Relationship status: Not on file     Intimate partner violence:     Fear of current or ex partner: Not on file     Emotionally abused: Not on file     Physically abused: Not on file     Forced sexual activity: Not on file   Other Topics Concern     Not on file   Social History Narrative    Patient lives with his wife in a house in [place], [place].  He works full time as an .  He has minimal alcohol consumption.  No tobacco exposure.  He has no pets.  Training for a Duathlon (run, bike, run).            Medications:     Current Outpatient Medications   Medication     albuterol (PROAIR HFA/PROVENTIL HFA/VENTOLIN HFA) 108 (90 Base) MCG/ACT inhaler      "budesonide-formoterol (SYMBICORT) 80-4.5 MCG/ACT Inhaler     Calcium-Vitamin D-Vitamin K 500-1000-40 MG-UNT-MCG CHEW     No current facility-administered medications for this visit.             Physical Exam:   /82 (BP Location: Right arm, Patient Position: Chair, Cuff Size: Adult Large)   Pulse 69   Resp 16   Ht 1.727 m (5' 7.99\")   Wt 88.5 kg (195 lb 1.7 oz)   SpO2 97%   BMI 29.67 kg/m      Constitutional:   Awake, alert and in no apparent distress     Eyes:   nonicteric     ENT:   oral mucosa moist without lesions, normal tm bilaterally, bilateral mucosal normal     Neck:   Supple without supraclavicular or cervical lymphadenopathy     Lungs:   Good air flow.  No crackles. No rhonchi.  No wheezes.     Cardiovascular:   Normal S1 and S2.  RRR.  No murmur, gallop or rub.     Abdomen:   NABS, soft, nontender, nondistended.       Musculoskeletal:   No edema     Neurologic:   Alert and conversant.     Skin:   Warm, dry.  No rash on limited exam.             Data:   All laboratory and imaging data reviewed.    Cystic Fibrosis Culture  Specimen Description   Date Value Ref Range Status   03/26/2019 Throat  Final   12/18/2018 Throat  Final   11/28/2017 Throat  Final    Culture Micro   Date Value Ref Range Status   03/26/2019 Moderate growth  Normal ariel    Preliminary   03/26/2019 (A)  Preliminary    Light growth  Probable  Klebsiella (Enterobacter) aerogenes  Susceptibility testing not routinely done     12/18/2018 Moderate growth  Normal ariel    Final   12/18/2018 Light growth  Staphylococcus aureus   (A)  Final        Recent Results (from the past 168 hour(s))   Routine UA with microscopic    Collection Time: 03/26/19  7:20 AM   Result Value Ref Range    Color Urine Yellow     Appearance Urine Clear     Glucose Urine Negative NEG^Negative mg/dL    Bilirubin Urine Negative NEG^Negative    Ketones Urine Negative NEG^Negative mg/dL    Specific Gravity Urine 1.019 1.003 - 1.035    Blood Urine Negative " NEG^Negative    pH Urine 5.0 5.0 - 7.0 pH    Protein Albumin Urine Negative NEG^Negative mg/dL    Urobilinogen mg/dL 0.0 0.0 - 2.0 mg/dL    Nitrite Urine Negative NEG^Negative    Leukocyte Esterase Urine Negative NEG^Negative    Source Midstream Urine     WBC Urine 1 0 - 5 /HPF    RBC Urine <1 0 - 2 /HPF    Squamous Epithelial /HPF Urine <1 0 - 1 /HPF    Mucous Urine Present (A) NEG^Negative /LPF   Albumin Random Urine Quantitative with Creat Ratio    Collection Time: 03/26/19  7:20 AM   Result Value Ref Range    Creatinine Urine 214 mg/dL    Albumin Urine mg/L 7 mg/L    Albumin Urine mg/g Cr 3.14 0 - 17 mg/g Cr   ALT    Collection Time: 03/26/19  7:30 AM   Result Value Ref Range    ALT 33 0 - 70 U/L   Basic metabolic panel    Collection Time: 03/26/19  7:30 AM   Result Value Ref Range    Sodium 138 133 - 144 mmol/L    Potassium 4.0 3.4 - 5.3 mmol/L    Chloride 104 94 - 109 mmol/L    Carbon Dioxide 29 20 - 32 mmol/L    Anion Gap 5 3 - 14 mmol/L    Glucose 90 70 - 99 mg/dL    Urea Nitrogen 15 7 - 30 mg/dL    Creatinine 0.96 0.66 - 1.25 mg/dL    GFR Estimate >90 >60 mL/min/[1.73_m2]    GFR Estimate If Black >90 >60 mL/min/[1.73_m2]    Calcium 9.1 8.5 - 10.1 mg/dL   Lipid Profile    Collection Time: 03/26/19  7:30 AM   Result Value Ref Range    Cholesterol 208 (H) <200 mg/dL    Triglycerides 135 <150 mg/dL    HDL Cholesterol 53 >39 mg/dL    LDL Cholesterol Calculated 129 (H) <100 mg/dL    Non HDL Cholesterol 156 (H) <130 mg/dL   Albumin level    Collection Time: 03/26/19  7:30 AM   Result Value Ref Range    Albumin 4.2 3.4 - 5.0 g/dL   Alkaline phosphatase    Collection Time: 03/26/19  7:30 AM   Result Value Ref Range    Alkaline Phosphatase 85 40 - 150 U/L   AST    Collection Time: 03/26/19  7:30 AM   Result Value Ref Range    AST 24 0 - 45 U/L   Iron    Collection Time: 03/26/19  7:30 AM   Result Value Ref Range    Iron 101 35 - 180 ug/dL   GGT    Collection Time: 03/26/19  7:30 AM   Result Value Ref Range    GGT 26  0 - 75 U/L   Hemoglobin A1c    Collection Time: 03/26/19  7:30 AM   Result Value Ref Range    Hemoglobin A1C 5.5 0 - 5.6 %   IgA    Collection Time: 03/26/19  7:30 AM   Result Value Ref Range     70 - 380 mg/dL   IgG    Collection Time: 03/26/19  7:30 AM   Result Value Ref Range    IGG 1,130 695 - 1,620 mg/dL   IgM    Collection Time: 03/26/19  7:30 AM   Result Value Ref Range    IGM 64 60 - 265 mg/dL   IgE    Collection Time: 03/26/19  7:30 AM   Result Value Ref Range     (H) 0 - 114 KIU/L   INR    Collection Time: 03/26/19  7:30 AM   Result Value Ref Range    INR 1.10 0.86 - 1.14   Magnesium    Collection Time: 03/26/19  7:30 AM   Result Value Ref Range    Magnesium 2.0 1.6 - 2.3 mg/dL   Phosphorus    Collection Time: 03/26/19  7:30 AM   Result Value Ref Range    Phosphorus 3.2 2.5 - 4.5 mg/dL   Testosterone total     Collection Time: 03/26/19  7:30 AM   Result Value Ref Range    Testosterone Total 734 240 - 950 ng/dL   Protein total    Collection Time: 03/26/19  7:30 AM   Result Value Ref Range    Protein Total 7.6 6.8 - 8.8 g/dL   TSH with free T4 reflex    Collection Time: 03/26/19  7:30 AM   Result Value Ref Range    TSH 1.71 0.40 - 4.00 mU/L   Vitamin D Deficiency    Collection Time: 03/26/19  7:30 AM   Result Value Ref Range    Vitamin D Deficiency screening 30 20 - 75 ug/L   CBC with platelets differential    Collection Time: 03/26/19  7:30 AM   Result Value Ref Range    WBC 7.0 4.0 - 11.0 10e9/L    RBC Count 5.56 4.4 - 5.9 10e12/L    Hemoglobin 17.5 13.3 - 17.7 g/dL    Hematocrit 49.5 40.0 - 53.0 %    MCV 89 78 - 100 fl    MCH 31.5 26.5 - 33.0 pg    MCHC 35.4 31.5 - 36.5 g/dL    RDW 12.0 10.0 - 15.0 %    Platelet Count 251 150 - 450 10e9/L    Diff Method Automated Method     % Neutrophils 38.4 %    % Lymphocytes 44.1 %    % Monocytes 8.0 %    % Eosinophils 8.7 %    % Basophils 0.7 %    % Immature Granulocytes 0.1 %    Nucleated RBCs 0 0 /100    Absolute Neutrophil 2.7 1.6 - 8.3 10e9/L     Absolute Lymphocytes 3.1 0.8 - 5.3 10e9/L    Absolute Monocytes 0.6 0.0 - 1.3 10e9/L    Absolute Eosinophils 0.6 0.0 - 0.7 10e9/L    Absolute Basophils 0.1 0.0 - 0.2 10e9/L    Abs Immature Granulocytes 0.0 0 - 0.4 10e9/L    Absolute Nucleated RBC 0.0    Erythrocyte sedimentation rate auto    Collection Time: 03/26/19  7:30 AM   Result Value Ref Range    Sed Rate 3 0 - 15 mm/h   General PFT Lab (Please always keep checked)    Collection Time: 03/26/19  7:40 AM   Result Value Ref Range    FVC-Pred 5.01 L    FVC-Pre 4.58 L    FVC-%Pred-Pre 91 %    FEV1-Pre 3.60 L    FEV1-%Pred-Pre 87 %    FEV1FVC-Pred 82 %    FEV1FVC-Pre 79 %    FEFMax-Pred 9.84 L/sec    FEFMax-Pre 10.57 L/sec    FEFMax-%Pred-Pre 107 %    FEF2575-Pred 4.11 L/sec    FEF2575-Pre 3.10 L/sec    KYV7069-%Pred-Pre 75 %    ExpTime-Pre 6.92 sec    FIFMax-Pre 9.37 L/sec    FEV1FEV6-Pred 82 %    FEV1FEV6-Pre 79 %   Cystic Fibrosis Culture Aerob Bacterial    Collection Time: 03/26/19  8:37 AM   Result Value Ref Range    Specimen Description Throat     Special Requests Specimen collected in eSwab transport (white cap)     Culture Micro Moderate growth  Normal ariel       Culture Micro (A)      Light growth  Probable  Klebsiella (Enterobacter) aerogenes  Susceptibility testing not routinely done         PFT: Mild obstructive lung disease.  When compared to 12/18/2019, the FEV1 and FVC have decreased.      Pulmonary exacerbation: absent

## 2019-03-26 NOTE — PROGRESS NOTES
"Nutrition Note    Reason for Visit:  Increasing LDL and Pt Question r/t Calcium Supplementation    LDL trend:  101 (2014), 107 (2017), 129 (3/26/19)    Increasing LDL level, however normal HDL (53) and normal TGL (135) and HbA1c (5.5). Pt reports family history of high cholesterol, several family members on medication for this.  He is already planning to increase activity level, would be open to modifying diet as well.    Regarding calcium, pt reports that his calcium supplement is large and has gotten stuck in his throat before, has had to discontinue taking it for this reason.  Would like to know if there is another option.     Interventions/Recommendations:  1) Cardiac Diet Education:  AND Manual handout \"Cholesterol-Lowering Nutrition Therapy\" given and reviewed with pt, discussed limiting saturated fat and high sugar foods/drinks and reviewed some common sources of saturated fat which could be modified (meat choices, dairy). Also encouraged pt to increase his level of both aerobic and weight-bearing exercise which he also discussed today with Dr. Ward.  Will see CF PT, Eladio, as well today.    2) Calcium:  CF pharmacist Kelsey provided education and product suggestion (500 mg chewable calcium carbonate) which pt can purchase OOP using his [employer] dollars. Will plan to discontinue his current calcium and D prescription in favor of this product (VitaFusion Calcium chews).  2 chews provides 500 mg calcium, 1000 units vitamin D.     Pt given my contact information for further questions, will follow.       Leta Escalona MS, RD, LD (pager [phone])  Cystic Fibrosis/Lung Transplant Dietitian      -Available Mon-Thurs 8 AM-4:30 PM. On Fridays please contact pager [phone] (Zee Burns RD) and on weekends/holidays contact coverage dietitian at pager [phone] (inpatient use only).   "

## 2019-03-26 NOTE — LETTER
3/26/2019       RE: Mitesh Tovar  [address] [place] [address]     Dear Colleague,    Thank you for referring your patient, Mitesh Tovar, to the [facility] at [facility]. Please see a copy of my visit note below.    General acute hospital for Lung Science and Health  March 26, 2019         Assessment and Plan:   Mitesh Tovar is a 38 year old male with cystic fibrosis.    1. CF lung disease with mild obstruction:  Mitesh reports today that he is doing fairly well.  He has not been as consistent with the use of his Symbicort as he has been in the past.  He feels also that he is not working out as much, so he is using his albuterol less.  From a symptom point of view, he reports after his last visit he did contact us for an antibiotic course and had resolution of a cough that had occurred.  We did review Mitesh's pulmonary function studies, which are showing evidence of decline.  Additionally, his last sputum culture did show Staph aureus.  We did review some old imaging that did not show evidence of bronchiectasis.  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  At this time I have recommended:    -- That he reinitiate his Symbicort at least once daily.  We discussed doing it in the morning.   -- He should get back to doing exercise.   -- We will do short-term followup and if he continues to show evidence of decline in his pulmonary function, we would re-pursue imaging and the possibility of initiation of some form of bronchial drainage if necessary.     2.  CFTR modulator therapy.  Mitesh's genotype could be amendable to immunomodulator therapy.  In the past, we have been hesitant to pursue this treatment because of his wellness.  This is certainly a  consideration for him in the future.     3.  Hypercholesterolemia.  Mitesh did show evidence of a high cholesterol and LDL.  I did ask Leta Escalona, our dietitian, to spend time with him today discussing dietary interventions.     4.  Health care maintenance.  Mitesh to get annual studies performed today.  I do await the results of the vitamin levels.     5.  Psychosocial.  Mitesh does continue to work full-time.  He is .  He reports that things are going well.     6. Pancreatic sufficiency:  The patient has no new symptoms consistent with worsening malabsorption.    - not on pancreatic enzymes  - continue vitamin supplementation--vitamin levels pending    HCM: done today  Immunizations: review at next visit  Colonoscopy: DILCIA Ward MD MPH  Associate Professor of Medicine  Pulmonary, Allergy, Critical Care and Sleep Medicine      Interval History:     Mitesh reports that he does occasionally produce a clear white sputum in the morning.  In general, he does not have a significant cough.  He does not do any form of bronchial drainage.  He has no trouble with activity tolerance.          Review of Systems:     CONSTITUTIONAL: no fever, no chills, no change in weight, no change in energy, no change in appetite    INTEGUMENTARY/SKIN: no rash, no obvious new lesions    ENT/MOUTH: no sore throat, no new sinus pain, no new nasal drainage     RESPIRATORY: see interval history    CV: no chest pain, no palpitations    GI: no nausea, no vomiting, no change in stools, no fatty stools, no GERD, no abdominal pain    : no dysuria, no urinary frequency    MUSCULOSKELETAL: no myalgias, no arthralgias    PSYCHIATRIC: mood stable          Past Medical and Surgical History:     Past Medical History:   Diagnosis Date     Bilateral external ear infections     frequent as a child     Congenital absence of vas deferens      Pneumonia     x 2, during college years     Seasonal allergies      Past Surgical History:    Procedure Laterality Date     MYRINGOTOMY, INSERT TUBE BILATERAL, COMBINED  1985, 1983           Family History:     Family History   Problem Relation Age of Onset     Genitourinary Problems Brother         CBAVD, no CF testing     Hypertension Mother      Hypertension Father      Diabetes Father      Cerebrovascular Disease Maternal Grandmother      Cancer Paternal Grandfather         type unknown            Social History:     Social History     Socioeconomic History     Marital status:      Spouse name: Not on file     Number of children: Not on file     Years of education: Not on file     Highest education level: Not on file   Occupational History     Occupation:    Social Needs     Financial resource strain: Not on file     Food insecurity:     Worry: Not on file     Inability: Not on file     Transportation needs:     Medical: Not on file     Non-medical: Not on file   Tobacco Use     Smoking status: Never Smoker     Smokeless tobacco: Never Used   Substance and Sexual Activity     Alcohol use: Yes     Alcohol/week: 0.0 oz     Frequency: Monthly or less     Drug use: No     Sexual activity: Yes     Partners: Female     Birth control/protection: Pill   Lifestyle     Physical activity:     Days per week: Not on file     Minutes per session: Not on file     Stress: Not on file   Relationships     Social connections:     Talks on phone: Not on file     Gets together: Not on file     Attends Judaism service: Not on file     Active member of club or organization: Not on file     Attends meetings of clubs or organizations: Not on file     Relationship status: Not on file     Intimate partner violence:     Fear of current or ex partner: Not on file     Emotionally abused: Not on file     Physically abused: Not on file     Forced sexual activity: Not on file   Other Topics Concern     Not on file   Social History Narrative    Patient lives with his wife in a house in [place], [place].  He works full time  "as an .  He has minimal alcohol consumption.  No tobacco exposure.  He has no pets.  Training for a Duathlon (run, bike, run).            Medications:     Current Outpatient Medications   Medication     albuterol (PROAIR HFA/PROVENTIL HFA/VENTOLIN HFA) 108 (90 Base) MCG/ACT inhaler     budesonide-formoterol (SYMBICORT) 80-4.5 MCG/ACT Inhaler     Calcium-Vitamin D-Vitamin K 500-1000-40 MG-UNT-MCG CHEW     No current facility-administered medications for this visit.             Physical Exam:   /82 (BP Location: Right arm, Patient Position: Chair, Cuff Size: Adult Large)   Pulse 69   Resp 16   Ht 1.727 m (5' 7.99\")   Wt 88.5 kg (195 lb 1.7 oz)   SpO2 97%   BMI 29.67 kg/m       Constitutional:   Awake, alert and in no apparent distress     Eyes:   nonicteric     ENT:   oral mucosa moist without lesions, normal tm bilaterally, bilateral mucosal normal     Neck:   Supple without supraclavicular or cervical lymphadenopathy     Lungs:   Good air flow.  No crackles. No rhonchi.  No wheezes.     Cardiovascular:   Normal S1 and S2.  RRR.  No murmur, gallop or rub.     Abdomen:   NABS, soft, nontender, nondistended.       Musculoskeletal:   No edema     Neurologic:   Alert and conversant.     Skin:   Warm, dry.  No rash on limited exam.             Data:   All laboratory and imaging data reviewed.    Cystic Fibrosis Culture  Specimen Description   Date Value Ref Range Status   03/26/2019 Throat  Final   12/18/2018 Throat  Final   11/28/2017 Throat  Final    Culture Micro   Date Value Ref Range Status   03/26/2019 Moderate growth  Normal ariel    Preliminary   03/26/2019 (A)  Preliminary    Light growth  Probable  Klebsiella (Enterobacter) aerogenes  Susceptibility testing not routinely done     12/18/2018 Moderate growth  Normal ariel    Final   12/18/2018 Light growth  Staphylococcus aureus   (A)  Final        Recent Results (from the past 168 hour(s))   Routine UA with microscopic    Collection Time: " 03/26/19  7:20 AM   Result Value Ref Range    Color Urine Yellow     Appearance Urine Clear     Glucose Urine Negative NEG^Negative mg/dL    Bilirubin Urine Negative NEG^Negative    Ketones Urine Negative NEG^Negative mg/dL    Specific Gravity Urine 1.019 1.003 - 1.035    Blood Urine Negative NEG^Negative    pH Urine 5.0 5.0 - 7.0 pH    Protein Albumin Urine Negative NEG^Negative mg/dL    Urobilinogen mg/dL 0.0 0.0 - 2.0 mg/dL    Nitrite Urine Negative NEG^Negative    Leukocyte Esterase Urine Negative NEG^Negative    Source Midstream Urine     WBC Urine 1 0 - 5 /HPF    RBC Urine <1 0 - 2 /HPF    Squamous Epithelial /HPF Urine <1 0 - 1 /HPF    Mucous Urine Present (A) NEG^Negative /LPF   Albumin Random Urine Quantitative with Creat Ratio    Collection Time: 03/26/19  7:20 AM   Result Value Ref Range    Creatinine Urine 214 mg/dL    Albumin Urine mg/L 7 mg/L    Albumin Urine mg/g Cr 3.14 0 - 17 mg/g Cr   ALT    Collection Time: 03/26/19  7:30 AM   Result Value Ref Range    ALT 33 0 - 70 U/L   Basic metabolic panel    Collection Time: 03/26/19  7:30 AM   Result Value Ref Range    Sodium 138 133 - 144 mmol/L    Potassium 4.0 3.4 - 5.3 mmol/L    Chloride 104 94 - 109 mmol/L    Carbon Dioxide 29 20 - 32 mmol/L    Anion Gap 5 3 - 14 mmol/L    Glucose 90 70 - 99 mg/dL    Urea Nitrogen 15 7 - 30 mg/dL    Creatinine 0.96 0.66 - 1.25 mg/dL    GFR Estimate >90 >60 mL/min/[1.73_m2]    GFR Estimate If Black >90 >60 mL/min/[1.73_m2]    Calcium 9.1 8.5 - 10.1 mg/dL   Lipid Profile    Collection Time: 03/26/19  7:30 AM   Result Value Ref Range    Cholesterol 208 (H) <200 mg/dL    Triglycerides 135 <150 mg/dL    HDL Cholesterol 53 >39 mg/dL    LDL Cholesterol Calculated 129 (H) <100 mg/dL    Non HDL Cholesterol 156 (H) <130 mg/dL   Albumin level    Collection Time: 03/26/19  7:30 AM   Result Value Ref Range    Albumin 4.2 3.4 - 5.0 g/dL   Alkaline phosphatase    Collection Time: 03/26/19  7:30 AM   Result Value Ref Range    Alkaline  Phosphatase 85 40 - 150 U/L   AST    Collection Time: 03/26/19  7:30 AM   Result Value Ref Range    AST 24 0 - 45 U/L   Iron    Collection Time: 03/26/19  7:30 AM   Result Value Ref Range    Iron 101 35 - 180 ug/dL   GGT    Collection Time: 03/26/19  7:30 AM   Result Value Ref Range    GGT 26 0 - 75 U/L   Hemoglobin A1c    Collection Time: 03/26/19  7:30 AM   Result Value Ref Range    Hemoglobin A1C 5.5 0 - 5.6 %   IgA    Collection Time: 03/26/19  7:30 AM   Result Value Ref Range     70 - 380 mg/dL   IgG    Collection Time: 03/26/19  7:30 AM   Result Value Ref Range    IGG 1,130 695 - 1,620 mg/dL   IgM    Collection Time: 03/26/19  7:30 AM   Result Value Ref Range    IGM 64 60 - 265 mg/dL   IgE    Collection Time: 03/26/19  7:30 AM   Result Value Ref Range     (H) 0 - 114 KIU/L   INR    Collection Time: 03/26/19  7:30 AM   Result Value Ref Range    INR 1.10 0.86 - 1.14   Magnesium    Collection Time: 03/26/19  7:30 AM   Result Value Ref Range    Magnesium 2.0 1.6 - 2.3 mg/dL   Phosphorus    Collection Time: 03/26/19  7:30 AM   Result Value Ref Range    Phosphorus 3.2 2.5 - 4.5 mg/dL   Testosterone total     Collection Time: 03/26/19  7:30 AM   Result Value Ref Range    Testosterone Total 734 240 - 950 ng/dL   Protein total    Collection Time: 03/26/19  7:30 AM   Result Value Ref Range    Protein Total 7.6 6.8 - 8.8 g/dL   TSH with free T4 reflex    Collection Time: 03/26/19  7:30 AM   Result Value Ref Range    TSH 1.71 0.40 - 4.00 mU/L   Vitamin D Deficiency    Collection Time: 03/26/19  7:30 AM   Result Value Ref Range    Vitamin D Deficiency screening 30 20 - 75 ug/L   CBC with platelets differential    Collection Time: 03/26/19  7:30 AM   Result Value Ref Range    WBC 7.0 4.0 - 11.0 10e9/L    RBC Count 5.56 4.4 - 5.9 10e12/L    Hemoglobin 17.5 13.3 - 17.7 g/dL    Hematocrit 49.5 40.0 - 53.0 %    MCV 89 78 - 100 fl    MCH 31.5 26.5 - 33.0 pg    MCHC 35.4 31.5 - 36.5 g/dL    RDW 12.0 10.0 - 15.0 %     Platelet Count 251 150 - 450 10e9/L    Diff Method Automated Method     % Neutrophils 38.4 %    % Lymphocytes 44.1 %    % Monocytes 8.0 %    % Eosinophils 8.7 %    % Basophils 0.7 %    % Immature Granulocytes 0.1 %    Nucleated RBCs 0 0 /100    Absolute Neutrophil 2.7 1.6 - 8.3 10e9/L    Absolute Lymphocytes 3.1 0.8 - 5.3 10e9/L    Absolute Monocytes 0.6 0.0 - 1.3 10e9/L    Absolute Eosinophils 0.6 0.0 - 0.7 10e9/L    Absolute Basophils 0.1 0.0 - 0.2 10e9/L    Abs Immature Granulocytes 0.0 0 - 0.4 10e9/L    Absolute Nucleated RBC 0.0    Erythrocyte sedimentation rate auto    Collection Time: 03/26/19  7:30 AM   Result Value Ref Range    Sed Rate 3 0 - 15 mm/h   General PFT Lab (Please always keep checked)    Collection Time: 03/26/19  7:40 AM   Result Value Ref Range    FVC-Pred 5.01 L    FVC-Pre 4.58 L    FVC-%Pred-Pre 91 %    FEV1-Pre 3.60 L    FEV1-%Pred-Pre 87 %    FEV1FVC-Pred 82 %    FEV1FVC-Pre 79 %    FEFMax-Pred 9.84 L/sec    FEFMax-Pre 10.57 L/sec    FEFMax-%Pred-Pre 107 %    FEF2575-Pred 4.11 L/sec    FEF2575-Pre 3.10 L/sec    AGJ5332-%Pred-Pre 75 %    ExpTime-Pre 6.92 sec    FIFMax-Pre 9.37 L/sec    FEV1FEV6-Pred 82 %    FEV1FEV6-Pre 79 %   Cystic Fibrosis Culture Aerob Bacterial    Collection Time: 03/26/19  8:37 AM   Result Value Ref Range    Specimen Description Throat     Special Requests Specimen collected in eSwab transport (white cap)     Culture Micro Moderate growth  Normal ariel       Culture Micro (A)      Light growth  Probable  Klebsiella (Enterobacter) aerogenes  Susceptibility testing not routinely done         PFT: Mild obstructive lung disease.  When compared to 12/18/2019, the FEV1 and FVC have decreased.      Pulmonary exacerbation: absent        Nutrition Note    Reason for Visit:  Increasing LDL and Pt Question r/t Calcium Supplementation    LDL trend:  101 (2014), 107 (2017), 129 (3/26/19)    Increasing LDL level, however normal HDL (53) and normal TGL (135) and HbA1c (5.5). Pt  "reports family history of high cholesterol, several family members on medication for this.  He is already planning to increase activity level, would be open to modifying diet as well.    Regarding calcium, pt reports that his calcium supplement is large and has gotten stuck in his throat before, has had to discontinue taking it for this reason.  Would like to know if there is another option.     Interventions/Recommendations:  1) Cardiac Diet Education:  AND Manual handout \"Cholesterol-Lowering Nutrition Therapy\" given and reviewed with pt, discussed limiting saturated fat and high sugar foods/drinks and reviewed some common sources of saturated fat which could be modified (meat choices, dairy). Also encouraged pt to increase his level of both aerobic and weight-bearing exercise which he also discussed today with Dr. Ward.  Will see CF PT, Eladio, as well today.    2) Calcium:  CF pharmacist Kelsey provided education and product suggestion (500 mg chewable calcium carbonate) which pt can purchase OOP using his [employer]A dollars. Will plan to discontinue his current calcium and D prescription in favor of this product (VitaFusion Calcium chews).  2 chews provides 500 mg calcium, 1000 units vitamin D.     Pt given my contact information for further questions, will follow.       Leta Escalona MS, RD, LD (pager [phone])  Cystic Fibrosis/Lung Transplant Dietitian      -Available Mon-Thurs 8 AM-4:30 PM. On Fridays please contact pager [phone] (Zee Burns RD) and on weekends/holidays contact coverage dietitian at pager [phone] (inpatient use only).     CF screen for PT needs    Symptoms of urinary incontinence: None  Exercise regimen (aerobic or resistance)/ daily activity: Bikes/runs 3x/wk with plans to increase to 5x/wk with warmer temperatures.  Each session lasts ~40min.  Has run 4 marathons and has history of consistent activity/exercsie.  Wife is a  and can advise patient on aerobic and " strengthening ex.  Encouraged to increase resistance training, back exercises specifically.   CF related pain limiting participation in daily activities: none  Gross postural impairment: overall good posture with minor scapular protraction       Again, thank you for allowing me to participate in the care of your patient.      Sincerely,    Carole Ward MD

## 2019-03-26 NOTE — PATIENT INSTRUCTIONS
Cystic Fibrosis Self-Care Plan    RECOMMENDATIONS:   Will have Leta discuss vitamins and cholesterol.  Exercise.  Get back on your inhaler.    YOUR GOAL: Improvement in PFT.      CF Nurse line:          Ludin Recinos   [phone]            CF Resp Therapist: Nohemy Colmenares            [phone]   CF Dietitians:           Zee Burns            [phone]                       Leta Escalona                       [phone]   CF Diabetes Nurse: Belinda Kelly             [phone]    CF Social Workers:  Kristen Hughes             [phone]                Elsa Marsahll            [phone]  CF Pharmacist:        Luz Maria Gonzalez                              [phone]  [email or web address]         MRN: [id number]   Clinic Date: March 26, 2019   Patient: Mitesh Tovar     Annual Studies:   IGG   Date Value Ref Range Status   11/28/2017 912 695 - 1,620 mg/dL Final     Insulin   Date Value Ref Range Status   11/28/2017 20.7 3 - 25 mU/L Final     Comment:     Starting 7/13/2017, insulin results will decrease by approximately 37%   compared to insulin results reported in EPIC between (12/16/2016-7/12/2017),   and should be interpreted accordingly. Insulin results reported prior to   12/16/16 are comparable to current insulin results and therefore no adjustment   is needed.       There are no preventive care reminders to display for this patient.      Pulmonary Function Tests  FEV1: amount of air you can blow out in 1 second  FVC: total amount of air you can take in and blow out    Your Goals:         PFT Latest Ref Rng & Units 3/26/2019   FVC L 4.58   FEV1 L 3.60   FVC% % 91   FEV1% % 87          Airway Clearance: The Most Important Way to Keep Your Lungs Healthy  Vest Settings:    Hill-Rom Frequencies: 8, 9, 10 Pressure 10 Then, Frequencies 18, 19, 20 Pressure 6      RespirTech: Quick Start with Pressure of     Do each frequency for 5 minutes; Deflate vest after each frequency & cough 3  times before beginning the next setting.        Good Nutrition Can Improve Lung Function and Overall Health     Take ALL of your vitamins with food     Take 1/2 of your enzymes before EVERY meal/snack and the other 1/2 mid-meal/snack    Wt Readings from Last 3 Encounters:   03/26/19 88.5 kg (195 lb 1.7 oz)   12/18/18 88.5 kg (195 lb)   09/19/18 90.1 kg (198 lb 9.6 oz)       Body mass index is 29.67 kg/m .         National CF Foundation Recommendations for BMI in CF Adults: Women: at least 22 Men: at least 23        Controlling Blood Sugars Helps Prevent Lung Infections & Improves Nutrition  Test blood sugar:     In the morning before eating (goal is )     2 hours after a meal (goal is less than 150)     When pre-meal glucose is greater than 150 add correction     At bedtime (if less than 100 eat a snack with 15 grams of carbohydrates  Last A1C Results:   Hemoglobin A1C   Date Value Ref Range Status   03/26/2019 5.5 0 - 5.6 % Final     Comment:     Normal <5.7% Prediabetes 5.7-6.4%  Diabetes 6.5% or higher - adopted from ADA   consensus guidelines.           If diabetic, measure A1C every 6 months. Goal: Under 7%    Staying Healthy    Research:  If you are interested in learning about research opportunities or have questions, please contact the CF Research Team at [phone] or [email or web address].      CF Foundation:  Compass is a personalized resource service to help you with the insurance, financial, legal and other issues you are facing.  It's free, confidential and available to anyone with CF.  Ask your  for more information or contact Compass directly at 133-RLMJDQK ([phone]) or compass@cff.org, or learn more at cff.org/compass.

## 2019-03-27 LAB
IGE SERPL-ACNC: 248 KIU/L (ref 0–114)
TESTOST SERPL-MCNC: 734 NG/DL (ref 240–950)

## 2019-03-29 ENCOUNTER — CARE COORDINATION (OUTPATIENT)
Dept: PULMONOLOGY | Facility: CLINIC | Age: 39
End: 2019-03-29

## 2019-03-29 DIAGNOSIS — E84.0 CYSTIC FIBROSIS WITH PULMONARY MANIFESTATIONS (H): Primary | ICD-10-CM

## 2019-03-29 LAB
A-TOCOPHEROL VIT E SERPL-MCNC: 9 MG/L (ref 5.5–18)
ANNOTATION COMMENT IMP: NORMAL
BETA+GAMMA TOCOPHEROL SERPL-MCNC: 1.3 MG/L (ref 0–6)
RETINYL PALMITATE SERPL-MCNC: 0.02 MG/L (ref 0–0.1)
VIT A SERPL-MCNC: 0.67 MG/L (ref 0.3–1.2)

## 2019-03-29 RX ORDER — CIPROFLOXACIN 750 MG/1
750 TABLET, FILM COATED ORAL 2 TIMES DAILY
Qty: 20 TABLET | Refills: 0 | Status: SHIPPED | OUTPATIENT
Start: 2019-03-29 | End: 2021-04-20

## 2019-03-29 NOTE — PROGRESS NOTES
Per Dr Ward:    Treat Klebsiella with Cipro 750 mg BID x10 days.    Voicemail message left for Mitesh.

## 2019-03-31 LAB
BACTERIA SPEC CULT: ABNORMAL
BACTERIA SPEC CULT: ABNORMAL
Lab: ABNORMAL
SPECIMEN SOURCE: ABNORMAL

## 2019-05-06 ENCOUNTER — DOCUMENTATION ONLY (OUTPATIENT)
Dept: CARE COORDINATION | Facility: CLINIC | Age: 39
End: 2019-05-06

## 2019-09-27 ENCOUNTER — HEALTH MAINTENANCE LETTER (OUTPATIENT)
Age: 39
End: 2019-09-27

## 2020-03-13 DIAGNOSIS — E84.9 CF (CYSTIC FIBROSIS) (H): ICD-10-CM

## 2020-03-16 RX ORDER — DILTIAZEM HYDROCHLORIDE 60 MG/1
TABLET, FILM COATED ORAL
Qty: 10.2 G | Refills: 1 | Status: SHIPPED | OUTPATIENT
Start: 2020-03-16 | End: 2020-03-19

## 2020-03-19 ENCOUNTER — MYC REFILL (OUTPATIENT)
Dept: PULMONOLOGY | Facility: CLINIC | Age: 40
End: 2020-03-19

## 2020-03-19 DIAGNOSIS — R06.2 WHEEZING: ICD-10-CM

## 2020-03-19 DIAGNOSIS — E84.0 CYSTIC FIBROSIS WITH PULMONARY MANIFESTATIONS (H): ICD-10-CM

## 2020-03-19 DIAGNOSIS — E84.9 CF (CYSTIC FIBROSIS) (H): ICD-10-CM

## 2020-03-19 RX ORDER — BUDESONIDE AND FORMOTEROL FUMARATE DIHYDRATE 80; 4.5 UG/1; UG/1
2 AEROSOL RESPIRATORY (INHALATION)
Qty: 10.2 G | Refills: 1 | Status: SHIPPED | OUTPATIENT
Start: 2020-03-19 | End: 2020-07-23

## 2020-03-19 RX ORDER — ALBUTEROL SULFATE 90 UG/1
2 AEROSOL, METERED RESPIRATORY (INHALATION) EVERY 6 HOURS PRN
Qty: 1 INHALER | Refills: 11 | Status: SHIPPED | OUTPATIENT
Start: 2020-03-19 | End: 2021-04-20

## 2020-07-22 DIAGNOSIS — E84.9 CF (CYSTIC FIBROSIS) (H): ICD-10-CM

## 2020-07-23 RX ORDER — BUDESONIDE AND FORMOTEROL FUMARATE DIHYDRATE 80; 4.5 UG/1; UG/1
AEROSOL RESPIRATORY (INHALATION)
Qty: 10.2 G | Refills: 1 | Status: SHIPPED | OUTPATIENT
Start: 2020-07-23 | End: 2020-10-30

## 2020-10-30 DIAGNOSIS — E84.9 CF (CYSTIC FIBROSIS) (H): ICD-10-CM

## 2020-10-30 RX ORDER — BUDESONIDE AND FORMOTEROL FUMARATE DIHYDRATE 80; 4.5 UG/1; UG/1
AEROSOL RESPIRATORY (INHALATION)
Qty: 10.2 G | Refills: 1 | Status: SHIPPED | OUTPATIENT
Start: 2020-10-30 | End: 2021-03-11

## 2021-01-09 ENCOUNTER — HEALTH MAINTENANCE LETTER (OUTPATIENT)
Age: 41
End: 2021-01-09

## 2021-02-21 DIAGNOSIS — E84.9 CF (CYSTIC FIBROSIS) (H): ICD-10-CM

## 2021-02-22 RX ORDER — BUDESONIDE AND FORMOTEROL FUMARATE DIHYDRATE 80; 4.5 UG/1; UG/1
AEROSOL RESPIRATORY (INHALATION)
Refills: 1 | OUTPATIENT
Start: 2021-02-22

## 2021-03-11 DIAGNOSIS — E84.9 CF (CYSTIC FIBROSIS) (H): ICD-10-CM

## 2021-03-11 RX ORDER — BUDESONIDE AND FORMOTEROL FUMARATE DIHYDRATE 80; 4.5 UG/1; UG/1
AEROSOL RESPIRATORY (INHALATION)
Qty: 10.2 G | Refills: 0 | Status: SHIPPED | OUTPATIENT
Start: 2021-03-11 | End: 2021-04-20

## 2021-04-19 NOTE — PATIENT INSTRUCTIONS
Cystic Fibrosis Self-Care Plan    RECOMMENDATIONS:   Mitesh, It was great to see you today.  The plan from today:  --work out, less candy  --J and J vaccine when available  --4 month follow up with labs and imaging  Take care of yourself!    YOUR GOAL:  Stay safe and well.  Enjoy the start of spring!      [facility] [place] Nurse line:  Feng Barakat Liz [phone]     [facility] [place] Fax Number:      [phone]         Cystic Fibrosis Respiratory Therapists:   Nohemy Colmenares              [phone]          Carolyn Campos   [phone]  Cystic Fibrosis Dietitians:              Zee Burns              [phone]                            Leta Escalona                        [phone]   Cystic Fibrosis Diabetes Nurse:    Belinda Kelly   [phone]    Cystic Fibrosis Social Workers:     Kristen Hughes               [phone]                     Meena De León               [phone]  Cystic Fibrosis Pharmacists:           Luz Maria Gonzalez                               [phone]         Kelsey Fairchild   [phone]  Cystic Fibrosis Genetic Counselor:   Yajaira Hughes    [phone]    [facility] [place] website:  [email or web address]    COVID VACCINES:    You are eligible for the COVID-19 vaccine. Sign up for your COVID vaccine via [employer]. Log in, select the menu bar, select schedule an appointment, and then select COVID-19 Vaccine 1st Dose. You may also schedule by calling this number [phone] however hold times can be long.       OR schedule through the [facility] Vaccine Connector at https://vaccineconnector.[place].gov/ or by calling [phone].      The best vaccine is the one that s available to you first.  All COVID-19 vaccines currently available in the [place] (Danny & Danny, Pfizer and Moderna) have been shown to be highly effect at preventing COVID-19.       We re still learning how vaccines will affect the spread of  COVID-19. After you ve been fully vaccinated against COVID-19, you should keep taking precautions in public places like wearing a mask, staying 6 feet apart from others, and avoiding crowds and poorly ventilated spaces until we know more.    People are considered fully vaccinated:  2 weeks after their second dose in a 2-dose series, such as the Pfizer or Moderna vaccines, or  2 weeks after a single-dose vaccine, such as Danny & Danny s Terrie vaccine    If you ve been fully vaccinated:  You can gather indoors with fully vaccinated people without wearing a mask.  You can gather indoors with unvaccinated people from one other household (for example, visiting with relatives who all live together) without masks, unless any of those people or anyone they live with has an increased risk for severe illness from COVID-19.  If you ve been around someone who has COVID-19, you do not need to stay away from others or get tested unless you have symptoms.  However, if you live in a group setting (like a correctional or [facility] facility or group home) and are around someone who has COVID-19, you should still stay away from others for 14 days and get tested, even if you don t have symptoms.         MRN: [id number]   Clinic Date: April 19, 2021   Patient: Mitesh Tovar     Annual Studies:   IGG   Date Value Ref Range Status   03/26/2019 1,130 695 - 1,620 mg/dL Final     Insulin   Date Value Ref Range Status   11/28/2017 20.7 3 - 25 mU/L Final     Comment:     Starting 7/13/2017, insulin results will decrease by approximately 37%   compared to insulin results reported in EPIC between (12/16/2016-7/12/2017),   and should be interpreted accordingly. Insulin results reported prior to   12/16/16 are comparable to current insulin results and therefore no adjustment   is needed.       There are no preventive care reminders to display for this patient.    Pulmonary Function Tests  FEV1: amount of air you can blow out in 1  second  FVC: total amount of air you can take in and blow out    Your Goals:         PFT Latest Ref Rng & Units 3/26/2019   FVC L 4.58   FEV1 L 3.60   FVC% % 91   FEV1% % 87          Airway Clearance: The Most Important Way to Keep Your Lungs Healthy  Vest Settings:    Hill-Rom Frequencies: 8, 9, 10 Pressure 10 Then, Frequencies 18, 19, 20 Pressure 6      RespirTech: Quick Start with Pressure of     Do each frequency for 5 minutes; Deflate vest after each frequency & cough 3 times before beginning the next setting.      Good Nutrition Can Improve Lung Function and Overall Health     Take ALL of your vitamins with food     Take 1/2 of your enzymes before EVERY meal/snack and the other 1/2 mid-meal/snack    Wt Readings from Last 3 Encounters:   03/26/19 88.5 kg (195 lb 1.7 oz)   12/18/18 88.5 kg (195 lb)   09/19/18 90.1 kg (198 lb 9.6 oz)       There is no height or weight on file to calculate BMI.         National CF Foundation Recommendations for BMI in CF Adults: Women: at least 22 Men: at least 23        Controlling Blood Sugars Helps Prevent Lung Infections & Improves Nutrition  Test blood sugar:     In the morning before eating (goal is )     2 hours after a meal (goal is less than 150)     When pre-meal glucose is greater than 150 add correction     At bedtime (if less than 100 eat a snack with 15 grams of carbohydrates  Last A1C Results:   Hemoglobin A1C   Date Value Ref Range Status   03/26/2019 5.5 0 - 5.6 % Final     Comment:     Normal <5.7% Prediabetes 5.7-6.4%  Diabetes 6.5% or higher - adopted from ADA   consensus guidelines.           If diabetic, measure A1C every 6 months. Goal: Under 7%    Staying Healthy    Research:  If you are interested in learning about research opportunities or have questions, please contact the CF Research Team at [phone] or [email or web address].      CF Foundation:  Compass is a personalized resource service to help you with the insurance, financial, legal and  other issues you are facing.  It's free, confidential and available to anyone with CF.  Ask your  for more information or contact Compass directly at 576-COMPASS ([phone]) or compass@cff.org, or learn more at cff.org/compass.

## 2021-04-19 NOTE — PROGRESS NOTES
[facility]  April 20, 2021         Assessment and Plan:   Mitesh Tovar is a 40 year old male with cystic fibrosis.    1. CF lung disease with normal spirometry: Mitesh reports he has little in the way of wheezing if he stays consistent with his use of Symbicort.  He does acknowledge that with his weight gain that he is not quite has fit.  He is now committed to doing exercise in order to lose about 20 pounds.  He does premedicate exercise with 2 puffs of albuterol and reports good activity tolerance tolerating.  Mitesh historically has grown out Klebsiella in his sputum.  We did treat him when this grew at his last visit a couple years ago.  Today sputum culture is pending.  At this time I have recommended to Mitesh:  --He should continue to use his Symbicort twice daily  --He should certainly continue to use his albuterol to premedicate exercise and has needed for wheeze or shortness of breath  --We discussed some type of lung imaging when he is seen again in 4 months time.  --I strongly encourage that he pursue the Covid vaccine    2. Pancreatic sufficiency/GI:  Mitesh has no new symptoms consistent with worsening malabsorption.    - Mitesh does not require pancreatic enzymes  - continue vitamin supplementation.    3.  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  He is a candidate for Trikafta.  Given his overall wellness and only disease manifestation being CBA VD it is unclear if it is appropriate to pursue this treatment.  He and I discussed today how it may be important to do a CT  scan of his chest to look for signs of bronchiectasis even though he has little in the way of pulmonary manifestations.  If he were to show evidence of bronchiectasis it would potentially be beneficial to start modulator therapy.  We discussed this imaging to occur in 4 months time.    4.  Hypercholesterolemia: Mitesh previously it has shown evidence of high  cholesterol and LDL.  We will repeat his laboratories when he seen again in 4 months time.  He is currently not on a lipid-lowering agent.    5.  Healthcare maintenance: Mitesh is due for his annual studies.  We will perform those in 4 months.  Additionally we did discuss pursuing the Covid vaccination.    6.  Weight maintenance: Mitesh and I discussed his 20 pound weight gain.  His wife is a  and is now doing workout with her.  We did discuss how would be appropriate to try and lose those 20 pounds for his overall health.    7.  Psychosocial: Mitesh continues to work full-time at [employer].  His wife is a .  He reports that things have gone well during the pandemic.    Annual studies due:  4 months  Immunizations: Encourage COVID  Colonoscopy: DILCIA Ward MD MPH  Associate Professor of Medicine  Pulmonary, Allergy, Critical Care and Sleep Medicine      Interval History:     Mitesh reports that he no longer has a daily wheeze.  He does cough every morning or produces clear sputum.  He does not feel tight chested or congested.  He has no shortness of breath.  He does premedicate exercise with albuterol.          Review of Systems:     CONSTITUTIONAL: no fever, no chills, no sweats, no change in weight, no change in energy, no change in appetite    INTEGUMENTARY/SKIN: no rash, no obvious new lesions    ENT/MOUTH: no sore throat, no new sinus pain, no new nasal drainage, no new nasal congestion, no ear ringing     RESPIRATORY: see interval history    CV: no chest pain, no palpitations, no peripheral edema    GI: no nausea, no vomiting, no change in stools, no fatty stools, no GERD    : negative    MUSCULOSKELETAL: no myalgias, no arthralgias    ENDOCRINE: negative    NEURO:  No headache    SLEEP: no issues    PSYCHIATRIC: mood great          Past Medical and Surgical History:     Past Medical History:   Diagnosis Date     Bilateral external ear infections     frequent  as a child     Congenital absence of vas deferens      Pneumonia     x 2, during college years     Seasonal allergies      Past Surgical History:   Procedure Laterality Date     MYRINGOTOMY, INSERT TUBE BILATERAL, COMBINED  1985, 1983           Family History:     Family History   Problem Relation Age of Onset     Genitourinary Problems Brother         CBAVD, no CF testing     Hypertension Mother      Hypertension Father      Diabetes Father      Cerebrovascular Disease Maternal Grandmother      Cancer Paternal Grandfather         type unknown            Social History:     Social History     Socioeconomic History     Marital status:      Spouse name: Not on file     Number of children: Not on file     Years of education: Not on file     Highest education level: Not on file   Occupational History     Occupation:    Social Needs     Financial resource strain: Not on file     Food insecurity     Worry: Not on file     Inability: Not on file     Transportation needs     Medical: Not on file     Non-medical: Not on file   Tobacco Use     Smoking status: Never Smoker     Smokeless tobacco: Never Used   Substance and Sexual Activity     Alcohol use: Yes     Alcohol/week: 0.0 standard drinks     Frequency: Monthly or less     Drug use: No     Sexual activity: Yes     Partners: Female     Birth control/protection: Pill   Lifestyle     Physical activity     Days per week: Not on file     Minutes per session: Not on file     Stress: Not on file   Relationships     Social connections     Talks on phone: Not on file     Gets together: Not on file     Attends Faith service: Not on file     Active member of club or organization: Not on file     Attends meetings of clubs or organizations: Not on file     Relationship status: Not on file     Intimate partner violence     Fear of current or ex partner: Not on file     Emotionally abused: Not on file     Physically abused: Not on file     Forced sexual activity:  "Not on file   Other Topics Concern     Not on file   Social History Narrative    Patient lives with his wife in a house in [place], [place].  He works full time as an .  He has minimal alcohol consumption.  No tobacco exposure.  He has no pets.  Training for a Duathlon (run, bike, run).            Medications:     Current Outpatient Medications   Medication     albuterol (PROAIR HFA/PROVENTIL HFA/VENTOLIN HFA) 108 (90 Base) MCG/ACT inhaler     budesonide-formoterol (SYMBICORT) 80-4.5 MCG/ACT Inhaler     Calcium-Vitamin D-Vitamin K 500-1000-40 MG-UNT-MCG CHEW     No current facility-administered medications for this visit.             Physical Exam:   /84   Pulse 62   Ht 1.733 m (5' 8.23\")   Wt 99.7 kg (219 lb 14.4 oz)   SpO2 97%   BMI 33.21 kg/m      Constitutional:   Awake, alert and in no apparent distress     Eyes:   nonicteric     ENT:   Mask in place     Neck:   Supple without supraclavicular or cervical lymphadenopathy     Lungs:   Good air flow.  No crackles. No rhonchi.  No wheezes.     Cardiovascular:   Normal S1 and S2.  RRR.  No murmur, gallop or rub.     Abdomen:   NABS, soft, nontender, nondistended.      Musculoskeletal:   No edema, no digital clubbing present     Neurologic:   Alert and conversant.     Skin:   Warm, dry.  No rash on limited exam.             Data:   All laboratory and imaging data reviewed.    Cystic Fibrosis Culture  Specimen Description   Date Value Ref Range Status   04/20/2021 Throat  Final   03/26/2019 Throat  Final   12/18/2018 Throat  Final    Culture Micro   Date Value Ref Range Status   04/20/2021 Moderate growth  Normal ariel to date    Preliminary   03/26/2019 Moderate growth  Normal ariel    Final   03/26/2019 (A)  Final    Light growth  Klebsiella (Enterobacter) aerogenes  Susceptibility testing not routinely done          Recent Results (from the past 168 hour(s))   General PFT Lab (Please always keep checked)    Collection Time: 04/20/21  7:05 AM   Result " Value Ref Range    FVC-Pred 4.98 L    FVC-Pre 4.58 L    FVC-%Pred-Pre 92 %    FEV1-Pre 3.57 L    FEV1-%Pred-Pre 88 %    FEV1FVC-Pred 82 %    FEV1FVC-Pre 78 %    FEFMax-Pred 9.81 L/sec    FEFMax-Pre 10.75 L/sec    FEFMax-%Pred-Pre 109 %    FEF2575-Pred 4.02 L/sec    FEF2575-Pre 3.05 L/sec    LHS1921-%Pred-Pre 75 %    ExpTime-Pre 7.39 sec    FIFMax-Pre 9.14 L/sec    FEV1FEV6-Pred 82 %    FEV1FEV6-Pre 80 %   Cystic Fibrosis Culture Aerob Bacterial    Collection Time: 04/20/21  7:20 AM    Specimen: Throat   Result Value Ref Range    Specimen Description Throat     Special Requests Specimen collected in eSwab transport (white cap)     Culture Micro Moderate growth  Normal ariel to date          PFT: The spirometry is normal.  When compared to 3/26/2019, the FEV1 and FVC have little change.      Pulmonary exacerbation: absent    45 minutes spent on the date of the encounter doing chart review, history and exam, documentation and further activities per the note

## 2021-04-20 ENCOUNTER — OFFICE VISIT (OUTPATIENT)
Dept: PULMONOLOGY | Facility: CLINIC | Age: 41
End: 2021-04-20
Attending: INTERNAL MEDICINE
Payer: COMMERCIAL

## 2021-04-20 VITALS
BODY MASS INDEX: 33.33 KG/M2 | SYSTOLIC BLOOD PRESSURE: 126 MMHG | HEART RATE: 62 BPM | HEIGHT: 68 IN | OXYGEN SATURATION: 97 % | WEIGHT: 219.9 LBS | DIASTOLIC BLOOD PRESSURE: 84 MMHG

## 2021-04-20 DIAGNOSIS — E84.0 CYSTIC FIBROSIS WITH PULMONARY MANIFESTATIONS (H): ICD-10-CM

## 2021-04-20 DIAGNOSIS — R06.2 WHEEZING: ICD-10-CM

## 2021-04-20 DIAGNOSIS — E84.9 CF (CYSTIC FIBROSIS) (H): Primary | ICD-10-CM

## 2021-04-20 LAB
EXPTIME-PRE: 7.39 SEC
FEF2575-%PRED-PRE: 75 %
FEF2575-PRE: 3.05 L/SEC
FEF2575-PRED: 4.02 L/SEC
FEFMAX-%PRED-PRE: 109 %
FEFMAX-PRE: 10.75 L/SEC
FEFMAX-PRED: 9.81 L/SEC
FEV1-%PRED-PRE: 88 %
FEV1-PRE: 3.57 L
FEV1FEV6-PRE: 80 %
FEV1FEV6-PRED: 82 %
FEV1FVC-PRE: 78 %
FEV1FVC-PRED: 82 %
FIFMAX-PRE: 9.14 L/SEC
FVC-%PRED-PRE: 92 %
FVC-PRE: 4.58 L
FVC-PRED: 4.98 L

## 2021-04-20 PROCEDURE — 87070 CULTURE OTHR SPECIMN AEROBIC: CPT | Performed by: INTERNAL MEDICINE

## 2021-04-20 PROCEDURE — G0463 HOSPITAL OUTPT CLINIC VISIT: HCPCS | Mod: 25

## 2021-04-20 PROCEDURE — 99215 OFFICE O/P EST HI 40 MIN: CPT | Mod: 25 | Performed by: INTERNAL MEDICINE

## 2021-04-20 PROCEDURE — 87186 SC STD MICRODIL/AGAR DIL: CPT | Performed by: INTERNAL MEDICINE

## 2021-04-20 PROCEDURE — 94375 RESPIRATORY FLOW VOLUME LOOP: CPT | Performed by: INTERNAL MEDICINE

## 2021-04-20 PROCEDURE — 87077 CULTURE AEROBIC IDENTIFY: CPT | Performed by: INTERNAL MEDICINE

## 2021-04-20 RX ORDER — BUDESONIDE AND FORMOTEROL FUMARATE DIHYDRATE 80; 4.5 UG/1; UG/1
2 AEROSOL RESPIRATORY (INHALATION) 2 TIMES DAILY
Qty: 10.2 G | Refills: 11 | Status: SHIPPED | OUTPATIENT
Start: 2021-04-20 | End: 2022-06-06

## 2021-04-20 RX ORDER — ALBUTEROL SULFATE 90 UG/1
2 AEROSOL, METERED RESPIRATORY (INHALATION) EVERY 6 HOURS PRN
Qty: 18 G | Refills: 11 | Status: SHIPPED | OUTPATIENT
Start: 2021-04-20

## 2021-04-20 ASSESSMENT — MIFFLIN-ST. JEOR: SCORE: 1895.61

## 2021-04-20 NOTE — NURSING NOTE
Chief Complaint   Patient presents with     Follow Up     cf      Vitals were taken and medications were reconciled.     COLUMBA Mcelroy

## 2021-04-20 NOTE — LETTER
4/20/2021         RE: Mitesh Tovar  [address] [place] [address]        Dear Colleague,    Thank you for referring your patient, Mitesh Tovar, to the [facility] [place]. Please see a copy of my visit note below.    [facility]  April 20, 2021         Assessment and Plan:   Mitesh Tovar is a 40 year old male with cystic fibrosis.    1. CF lung disease with normal spirometry: Mitesh reports he has little in the way of wheezing if he stays consistent with his use of Symbicort.  He does acknowledge that with his weight gain that he is not quite has fit.  He is now committed to doing exercise in order to lose about 20 pounds.  He does premedicate exercise with 2 puffs of albuterol and reports good activity tolerance tolerating.  Mitesh historically has grown out Klebsiella in his sputum.  We did treat him when this grew at his last visit a couple years ago.  Today sputum culture is pending.  At this time I have recommended to Mitesh:  --He should continue to use his Symbicort twice daily  --He should certainly continue to use his albuterol to premedicate exercise and has needed for wheeze or shortness of breath  --We discussed some type of lung imaging when he is seen again in 4 months time.  --I strongly encourage that he pursue the Covid vaccine    2. Pancreatic sufficiency/GI:  Mitesh has no new symptoms consistent with worsening malabsorption.    - Mitesh does not require pancreatic enzymes  - continue vitamin supplementation.    3.  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  He is a candidate for Trikafta.  Given his overall wellness and only disease manifestation being CBA VD it is unclear if it is appropriate to pursue this treatment.  He and I discussed today how it may be important to do a CT  scan of his chest to look for signs of bronchiectasis even though he has  little in the way of pulmonary manifestations.  If he were to show evidence of bronchiectasis it would potentially be beneficial to start modulator therapy.  We discussed this imaging to occur in 4 months time.    4.  Hypercholesterolemia: Mitesh previously it has shown evidence of high cholesterol and LDL.  We will repeat his laboratories when he seen again in 4 months time.  He is currently not on a lipid-lowering agent.    5.  Healthcare maintenance: Mitesh is due for his annual studies.  We will perform those in 4 months.  Additionally we did discuss pursuing the Covid vaccination.    6.  Weight maintenance: Mitesh and I discussed his 20 pound weight gain.  His wife is a  and is now doing workout with her.  We did discuss how would be appropriate to try and lose those 20 pounds for his overall health.    7.  Psychosocial: Mitesh continues to work full-time at [employer].  His wife is a .  He reports that things have gone well during the pandemic.    Annual studies due:  4 months  Immunizations: Encourage COVID  Colonoscopy: NA    Carole Ward MD MPH  Associate Professor of Medicine  Pulmonary, Allergy, Critical Care and Sleep Medicine      Interval History:     Mitesh reports that he no longer has a daily wheeze.  He does cough every morning or produces clear sputum.  He does not feel tight chested or congested.  He has no shortness of breath.  He does premedicate exercise with albuterol.          Review of Systems:     CONSTITUTIONAL: no fever, no chills, no sweats, no change in weight, no change in energy, no change in appetite    INTEGUMENTARY/SKIN: no rash, no obvious new lesions    ENT/MOUTH: no sore throat, no new sinus pain, no new nasal drainage, no new nasal congestion, no ear ringing     RESPIRATORY: see interval history    CV: no chest pain, no palpitations, no peripheral edema    GI: no nausea, no vomiting, no change in stools, no fatty stools, no GERD    :  negative    MUSCULOSKELETAL: no myalgias, no arthralgias    ENDOCRINE: negative    NEURO:  No headache    SLEEP: no issues    PSYCHIATRIC: mood great          Past Medical and Surgical History:     Past Medical History:   Diagnosis Date     Bilateral external ear infections     frequent as a child     Congenital absence of vas deferens      Pneumonia     x 2, during college years     Seasonal allergies      Past Surgical History:   Procedure Laterality Date     MYRINGOTOMY, INSERT TUBE BILATERAL, COMBINED  1985, 1983           Family History:     Family History   Problem Relation Age of Onset     Genitourinary Problems Brother         CBAVD, no CF testing     Hypertension Mother      Hypertension Father      Diabetes Father      Cerebrovascular Disease Maternal Grandmother      Cancer Paternal Grandfather         type unknown            Social History:     Social History     Socioeconomic History     Marital status:      Spouse name: Not on file     Number of children: Not on file     Years of education: Not on file     Highest education level: Not on file   Occupational History     Occupation:    Social Needs     Financial resource strain: Not on file     Food insecurity     Worry: Not on file     Inability: Not on file     Transportation needs     Medical: Not on file     Non-medical: Not on file   Tobacco Use     Smoking status: Never Smoker     Smokeless tobacco: Never Used   Substance and Sexual Activity     Alcohol use: Yes     Alcohol/week: 0.0 standard drinks     Frequency: Monthly or less     Drug use: No     Sexual activity: Yes     Partners: Female     Birth control/protection: Pill   Lifestyle     Physical activity     Days per week: Not on file     Minutes per session: Not on file     Stress: Not on file   Relationships     Social connections     Talks on phone: Not on file     Gets together: Not on file     Attends Restorationism service: Not on file     Active member of club or  "organization: Not on file     Attends meetings of clubs or organizations: Not on file     Relationship status: Not on file     Intimate partner violence     Fear of current or ex partner: Not on file     Emotionally abused: Not on file     Physically abused: Not on file     Forced sexual activity: Not on file   Other Topics Concern     Not on file   Social History Narrative    Patient lives with his wife in a house in [place], [place].  He works full time as an .  He has minimal alcohol consumption.  No tobacco exposure.  He has no pets.  Training for a Duathlon (run, bike, run).            Medications:     Current Outpatient Medications   Medication     albuterol (PROAIR HFA/PROVENTIL HFA/VENTOLIN HFA) 108 (90 Base) MCG/ACT inhaler     budesonide-formoterol (SYMBICORT) 80-4.5 MCG/ACT Inhaler     Calcium-Vitamin D-Vitamin K 500-1000-40 MG-UNT-MCG CHEW     No current facility-administered medications for this visit.             Physical Exam:   /84   Pulse 62   Ht 1.733 m (5' 8.23\")   Wt 99.7 kg (219 lb 14.4 oz)   SpO2 97%   BMI 33.21 kg/m      Constitutional:   Awake, alert and in no apparent distress     Eyes:   nonicteric     ENT:   Mask in place     Neck:   Supple without supraclavicular or cervical lymphadenopathy     Lungs:   Good air flow.  No crackles. No rhonchi.  No wheezes.     Cardiovascular:   Normal S1 and S2.  RRR.  No murmur, gallop or rub.     Abdomen:   NABS, soft, nontender, nondistended.      Musculoskeletal:   No edema, no digital clubbing present     Neurologic:   Alert and conversant.     Skin:   Warm, dry.  No rash on limited exam.             Data:   All laboratory and imaging data reviewed.    Cystic Fibrosis Culture  Specimen Description   Date Value Ref Range Status   04/20/2021 Throat  Final   03/26/2019 Throat  Final   12/18/2018 Throat  Final    Culture Micro   Date Value Ref Range Status   04/20/2021 Moderate growth  Normal ariel to date    Preliminary   03/26/2019 " Moderate growth  Normal ariel    Final   03/26/2019 (A)  Final    Light growth  Klebsiella (Enterobacter) aerogenes  Susceptibility testing not routinely done          Recent Results (from the past 168 hour(s))   General PFT Lab (Please always keep checked)    Collection Time: 04/20/21  7:05 AM   Result Value Ref Range    FVC-Pred 4.98 L    FVC-Pre 4.58 L    FVC-%Pred-Pre 92 %    FEV1-Pre 3.57 L    FEV1-%Pred-Pre 88 %    FEV1FVC-Pred 82 %    FEV1FVC-Pre 78 %    FEFMax-Pred 9.81 L/sec    FEFMax-Pre 10.75 L/sec    FEFMax-%Pred-Pre 109 %    FEF2575-Pred 4.02 L/sec    FEF2575-Pre 3.05 L/sec    KBR4755-%Pred-Pre 75 %    ExpTime-Pre 7.39 sec    FIFMax-Pre 9.14 L/sec    FEV1FEV6-Pred 82 %    FEV1FEV6-Pre 80 %   Cystic Fibrosis Culture Aerob Bacterial    Collection Time: 04/20/21  7:20 AM    Specimen: Throat   Result Value Ref Range    Specimen Description Throat     Special Requests Specimen collected in eSwab transport (white cap)     Culture Micro Moderate growth  Normal ariel to date          PFT: The spirometry is normal.  When compared to 3/26/2019, the FEV1 and FVC have little change.      Pulmonary exacerbation: absent    45 minutes spent on the date of the encounter doing chart review, history and exam, documentation and further activities per the note      Again, thank you for allowing me to participate in the care of your patient.        Sincerely,        Carole Ward MD

## 2021-04-25 LAB
BACTERIA SPEC CULT: ABNORMAL
Lab: ABNORMAL
SPECIMEN SOURCE: ABNORMAL

## 2021-04-30 ENCOUNTER — ANCILLARY PROCEDURE (OUTPATIENT)
Dept: CT IMAGING | Facility: CLINIC | Age: 41
End: 2021-04-30
Payer: COMMERCIAL

## 2021-04-30 DIAGNOSIS — E84.9 CF (CYSTIC FIBROSIS) (H): ICD-10-CM

## 2021-04-30 DIAGNOSIS — E84.0 CYSTIC FIBROSIS WITH PULMONARY MANIFESTATIONS (H): ICD-10-CM

## 2021-04-30 PROCEDURE — 71250 CT THORAX DX C-: CPT | Mod: GC | Performed by: RADIOLOGY

## 2021-06-09 NOTE — PROGRESS NOTES
Mitesh Tovar presents to [facility] for a glucose tolerance test.  During today's [facility] appointment orders from Dr. Carole Ward were completed.    Patient NPO: YES  CF annual labs completed YES  Patient drank 75 grams glucola at 0730 within 10 minutes.    Administrations This Visit     glucose tolerence test (GLUCOLA) 25% oral liquid 75 g     Admin Date Action Dose Route Administered By             11/28/2017 Given 75 g Oral Josephine Grossman RN                          Labs drawn at baseline, +30, +60, +90 and +120 minutes post glucola.    Pt tolerated glucose tolerance testwell    Post test blood sugar 95    Patient given snack YES    Patient discharged at 0935 with self to next appt.    Josephine Grossman RN    
What Is The Reason For Today's Visit?: Full Body Skin Examination
What Is The Reason For Today's Visit? (Being Monitored For X): concerning skin lesions on an annual basis

## 2021-08-24 ENCOUNTER — OFFICE VISIT (OUTPATIENT)
Dept: PULMONOLOGY | Facility: CLINIC | Age: 41
End: 2021-08-24
Attending: INTERNAL MEDICINE
Payer: COMMERCIAL

## 2021-08-24 ENCOUNTER — OFFICE VISIT (OUTPATIENT)
Dept: PHARMACY | Facility: CLINIC | Age: 41
End: 2021-08-24
Payer: COMMERCIAL

## 2021-08-24 ENCOUNTER — ALLIED HEALTH/NURSE VISIT (OUTPATIENT)
Dept: CARE COORDINATION | Facility: CLINIC | Age: 41
End: 2021-08-24

## 2021-08-24 ENCOUNTER — LAB (OUTPATIENT)
Dept: LAB | Facility: CLINIC | Age: 41
End: 2021-08-24
Attending: INTERNAL MEDICINE
Payer: COMMERCIAL

## 2021-08-24 ENCOUNTER — ANCILLARY PROCEDURE (OUTPATIENT)
Dept: BONE DENSITY | Facility: CLINIC | Age: 41
End: 2021-08-24
Payer: COMMERCIAL

## 2021-08-24 VITALS
OXYGEN SATURATION: 97 % | HEIGHT: 68 IN | HEART RATE: 61 BPM | BODY MASS INDEX: 33.71 KG/M2 | SYSTOLIC BLOOD PRESSURE: 120 MMHG | DIASTOLIC BLOOD PRESSURE: 80 MMHG | WEIGHT: 222.4 LBS

## 2021-08-24 DIAGNOSIS — E78.00 HYPERCHOLESTEROLEMIA: ICD-10-CM

## 2021-08-24 DIAGNOSIS — E84.0 CYSTIC FIBROSIS WITH PULMONARY MANIFESTATIONS (H): Primary | ICD-10-CM

## 2021-08-24 DIAGNOSIS — E84.9 CF (CYSTIC FIBROSIS) (H): ICD-10-CM

## 2021-08-24 DIAGNOSIS — E84.9 CF (CYSTIC FIBROSIS) (H): Primary | ICD-10-CM

## 2021-08-24 DIAGNOSIS — Z78.9 TAKES DIETARY SUPPLEMENTS: ICD-10-CM

## 2021-08-24 DIAGNOSIS — Z13.9 RISK AND FUNCTIONAL ASSESSMENT: Primary | ICD-10-CM

## 2021-08-24 DIAGNOSIS — Z71.85 VACCINE COUNSELING: ICD-10-CM

## 2021-08-24 LAB
ALBUMIN SERPL-MCNC: 3.9 G/DL (ref 3.4–5)
ALP SERPL-CCNC: 81 U/L (ref 40–150)
ALT SERPL W P-5'-P-CCNC: 49 U/L (ref 0–70)
ANION GAP SERPL CALCULATED.3IONS-SCNC: 5 MMOL/L (ref 3–14)
AST SERPL W P-5'-P-CCNC: 27 U/L (ref 0–45)
BASOPHILS # BLD AUTO: 0.1 10E3/UL (ref 0–0.2)
BASOPHILS NFR BLD AUTO: 1 %
BUN SERPL-MCNC: 18 MG/DL (ref 7–30)
CALCIUM SERPL-MCNC: 8.9 MG/DL (ref 8.5–10.1)
CHLORIDE BLD-SCNC: 110 MMOL/L (ref 94–109)
CHOLEST SERPL-MCNC: 214 MG/DL
CO2 SERPL-SCNC: 26 MMOL/L (ref 20–32)
CREAT SERPL-MCNC: 0.99 MG/DL (ref 0.66–1.25)
DEPRECATED CALCIDIOL+CALCIFEROL SERPL-MC: 31 UG/L (ref 20–75)
EOSINOPHIL # BLD AUTO: 0.4 10E3/UL (ref 0–0.7)
EOSINOPHIL NFR BLD AUTO: 5 %
ERYTHROCYTE [DISTWIDTH] IN BLOOD BY AUTOMATED COUNT: 12.1 % (ref 10–15)
ERYTHROCYTE [SEDIMENTATION RATE] IN BLOOD BY WESTERGREN METHOD: 4 MM/HR (ref 0–15)
EXPTIME-PRE: 6.81 SEC
FASTING STATUS PATIENT QL REPORTED: YES
FEF2575-%PRED-PRE: 59 %
FEF2575-PRE: 2.4 L/SEC
FEF2575-PRED: 4.02 L/SEC
FEFMAX-%PRED-PRE: 97 %
FEFMAX-PRE: 9.59 L/SEC
FEFMAX-PRED: 9.81 L/SEC
FEV1-%PRED-PRE: 79 %
FEV1-PRE: 3.2 L
FEV1FEV6-PRE: 75 %
FEV1FEV6-PRED: 82 %
FEV1FVC-PRE: 75 %
FEV1FVC-PRED: 82 %
FIFMAX-PRE: 8.57 L/SEC
FVC-%PRED-PRE: 86 %
FVC-PRE: 4.28 L
FVC-PRED: 4.98 L
GFR SERPL CREATININE-BSD FRML MDRD: >90 ML/MIN/1.73M2
GGT SERPL-CCNC: 28 U/L (ref 0–75)
GLUCOSE BLD-MCNC: 96 MG/DL (ref 70–99)
HBA1C MFR BLD: 4.9 % (ref 0–5.6)
HCT VFR BLD AUTO: 45.3 % (ref 40–53)
HDLC SERPL-MCNC: 44 MG/DL
HGB BLD-MCNC: 16.4 G/DL (ref 13.3–17.7)
IGA SERPL-MCNC: 291 MG/DL (ref 84–499)
IGG SERPL-MCNC: 974 MG/DL (ref 610–1616)
IGM SERPL-MCNC: 72 MG/DL (ref 35–242)
IMM GRANULOCYTES # BLD: 0 10E3/UL
IMM GRANULOCYTES NFR BLD: 0 %
INR PPP: 1.01 (ref 0.85–1.15)
IRON SERPL-MCNC: 81 UG/DL (ref 35–180)
LDLC SERPL CALC-MCNC: 150 MG/DL
LYMPHOCYTES # BLD AUTO: 1.9 10E3/UL (ref 0.8–5.3)
LYMPHOCYTES NFR BLD AUTO: 25 %
MAGNESIUM SERPL-MCNC: 2.1 MG/DL (ref 1.6–2.3)
MCH RBC QN AUTO: 30.7 PG (ref 26.5–33)
MCHC RBC AUTO-ENTMCNC: 36.2 G/DL (ref 31.5–36.5)
MCV RBC AUTO: 85 FL (ref 78–100)
MONOCYTES # BLD AUTO: 0.8 10E3/UL (ref 0–1.3)
MONOCYTES NFR BLD AUTO: 10 %
NEUTROPHILS # BLD AUTO: 4.4 10E3/UL (ref 1.6–8.3)
NEUTROPHILS NFR BLD AUTO: 59 %
NONHDLC SERPL-MCNC: 170 MG/DL
NRBC # BLD AUTO: 0 10E3/UL
NRBC BLD AUTO-RTO: 0 /100
PHOSPHATE SERPL-MCNC: 2.4 MG/DL (ref 2.5–4.5)
PLATELET # BLD AUTO: 236 10E3/UL (ref 150–450)
POTASSIUM BLD-SCNC: 4 MMOL/L (ref 3.4–5.3)
PROT SERPL-MCNC: 7 G/DL (ref 6.8–8.8)
RBC # BLD AUTO: 5.34 10E6/UL (ref 4.4–5.9)
SODIUM SERPL-SCNC: 141 MMOL/L (ref 133–144)
TRIGL SERPL-MCNC: 102 MG/DL
TSH SERPL DL<=0.005 MIU/L-ACNC: 1.1 MU/L (ref 0.4–4)
WBC # BLD AUTO: 7.5 10E3/UL (ref 4–11)

## 2021-08-24 PROCEDURE — 84403 ASSAY OF TOTAL TESTOSTERONE: CPT | Mod: 90 | Performed by: PATHOLOGY

## 2021-08-24 PROCEDURE — 99605 MTMS BY PHARM NP 15 MIN: CPT | Performed by: PHARMACIST

## 2021-08-24 PROCEDURE — G0463 HOSPITAL OUTPT CLINIC VISIT: HCPCS | Mod: 25

## 2021-08-24 PROCEDURE — 83036 HEMOGLOBIN GLYCOSYLATED A1C: CPT | Performed by: PATHOLOGY

## 2021-08-24 PROCEDURE — 90715 TDAP VACCINE 7 YRS/> IM: CPT | Performed by: INTERNAL MEDICINE

## 2021-08-24 PROCEDURE — 84450 TRANSFERASE (AST) (SGOT): CPT | Performed by: PATHOLOGY

## 2021-08-24 PROCEDURE — 97803 MED NUTRITION INDIV SUBSEQ: CPT | Performed by: DIETITIAN, REGISTERED

## 2021-08-24 PROCEDURE — 84590 ASSAY OF VITAMIN A: CPT | Mod: 90 | Performed by: PATHOLOGY

## 2021-08-24 PROCEDURE — 90471 IMMUNIZATION ADMIN: CPT | Performed by: INTERNAL MEDICINE

## 2021-08-24 PROCEDURE — 80069 RENAL FUNCTION PANEL: CPT | Performed by: PATHOLOGY

## 2021-08-24 PROCEDURE — 87103 BLOOD FUNGUS CULTURE: CPT | Mod: 90 | Performed by: PATHOLOGY

## 2021-08-24 PROCEDURE — 85025 COMPLETE CBC W/AUTO DIFF WBC: CPT | Performed by: PATHOLOGY

## 2021-08-24 PROCEDURE — 82785 ASSAY OF IGE: CPT | Mod: 90 | Performed by: PATHOLOGY

## 2021-08-24 PROCEDURE — 87070 CULTURE OTHR SPECIMN AEROBIC: CPT | Performed by: INTERNAL MEDICINE

## 2021-08-24 PROCEDURE — 83540 ASSAY OF IRON: CPT | Performed by: PATHOLOGY

## 2021-08-24 PROCEDURE — 80061 LIPID PANEL: CPT | Performed by: PATHOLOGY

## 2021-08-24 PROCEDURE — 77080 DXA BONE DENSITY AXIAL: CPT | Performed by: INTERNAL MEDICINE

## 2021-08-24 PROCEDURE — 85610 PROTHROMBIN TIME: CPT | Performed by: PATHOLOGY

## 2021-08-24 PROCEDURE — 85652 RBC SED RATE AUTOMATED: CPT | Performed by: PATHOLOGY

## 2021-08-24 PROCEDURE — 84075 ASSAY ALKALINE PHOSPHATASE: CPT | Performed by: PATHOLOGY

## 2021-08-24 PROCEDURE — 82306 VITAMIN D 25 HYDROXY: CPT | Mod: 90 | Performed by: PATHOLOGY

## 2021-08-24 PROCEDURE — 94375 RESPIRATORY FLOW VOLUME LOOP: CPT | Performed by: INTERNAL MEDICINE

## 2021-08-24 PROCEDURE — 99607 MTMS BY PHARM ADDL 15 MIN: CPT | Performed by: PHARMACIST

## 2021-08-24 PROCEDURE — 82784 ASSAY IGA/IGD/IGG/IGM EACH: CPT | Mod: 90 | Performed by: PATHOLOGY

## 2021-08-24 PROCEDURE — 84443 ASSAY THYROID STIM HORMONE: CPT | Performed by: PATHOLOGY

## 2021-08-24 PROCEDURE — 84155 ASSAY OF PROTEIN SERUM: CPT | Performed by: PATHOLOGY

## 2021-08-24 PROCEDURE — 83735 ASSAY OF MAGNESIUM: CPT | Performed by: PATHOLOGY

## 2021-08-24 PROCEDURE — 250N000011 HC RX IP 250 OP 636: Performed by: INTERNAL MEDICINE

## 2021-08-24 PROCEDURE — 36415 COLL VENOUS BLD VENIPUNCTURE: CPT | Performed by: PATHOLOGY

## 2021-08-24 PROCEDURE — 82977 ASSAY OF GGT: CPT | Mod: 90 | Performed by: PATHOLOGY

## 2021-08-24 PROCEDURE — 84446 ASSAY OF VITAMIN E: CPT | Mod: 90 | Performed by: PATHOLOGY

## 2021-08-24 PROCEDURE — 99214 OFFICE O/P EST MOD 30 MIN: CPT | Mod: 25 | Performed by: INTERNAL MEDICINE

## 2021-08-24 PROCEDURE — 84460 ALANINE AMINO (ALT) (SGPT): CPT | Performed by: PATHOLOGY

## 2021-08-24 RX ADMIN — TETANUS TOXOID, REDUCED DIPHTHERIA TOXOID AND ACELLULAR PERTUSSIS VACCINE, ADSORBED 0.5 ML: 5; 2.5; 8; 8; 2.5 SUSPENSION INTRAMUSCULAR at 09:23

## 2021-08-24 ASSESSMENT — MIFFLIN-ST. JEOR: SCORE: 1906.95

## 2021-08-24 ASSESSMENT — ASTHMA QUESTIONNAIRES
QUESTION_1 LAST FOUR WEEKS HOW MUCH OF THE TIME DID YOUR ASTHMA KEEP YOU FROM GETTING AS MUCH DONE AT WORK, SCHOOL OR AT HOME: NONE OF THE TIME
ACT_TOTALSCORE: 25
QUESTION_2 LAST FOUR WEEKS HOW OFTEN HAVE YOU HAD SHORTNESS OF BREATH: NOT AT ALL
QUESTION_5 LAST FOUR WEEKS HOW WOULD YOU RATE YOUR ASTHMA CONTROL: COMPLETELY CONTROLLED
QUESTION_3 LAST FOUR WEEKS HOW OFTEN DID YOUR ASTHMA SYMPTOMS (WHEEZING, COUGHING, SHORTNESS OF BREATH, CHEST TIGHTNESS OR PAIN) WAKE YOU UP AT NIGHT OR EARLIER THAN USUAL IN THE MORNING: NOT AT ALL
QUESTION_4 LAST FOUR WEEKS HOW OFTEN HAVE YOU USED YOUR RESCUE INHALER OR NEBULIZER MEDICATION (SUCH AS ALBUTEROL): NOT AT ALL

## 2021-08-24 ASSESSMENT — ANXIETY QUESTIONNAIRES
3. WORRYING TOO MUCH ABOUT DIFFERENT THINGS: NOT AT ALL
7. FEELING AFRAID AS IF SOMETHING AWFUL MIGHT HAPPEN: NOT AT ALL
5. BEING SO RESTLESS THAT IT IS HARD TO SIT STILL: NOT AT ALL
GAD7 TOTAL SCORE: 0
6. BECOMING EASILY ANNOYED OR IRRITABLE: NOT AT ALL
1. FEELING NERVOUS, ANXIOUS, OR ON EDGE: NOT AT ALL
2. NOT BEING ABLE TO STOP OR CONTROL WORRYING: NOT AT ALL
IF YOU CHECKED OFF ANY PROBLEMS ON THIS QUESTIONNAIRE, HOW DIFFICULT HAVE THESE PROBLEMS MADE IT FOR YOU TO DO YOUR WORK, TAKE CARE OF THINGS AT HOME, OR GET ALONG WITH OTHER PEOPLE: NOT DIFFICULT AT ALL

## 2021-08-24 ASSESSMENT — PATIENT HEALTH QUESTIONNAIRE - PHQ9
5. POOR APPETITE OR OVEREATING: NOT AT ALL
SUM OF ALL RESPONSES TO PHQ QUESTIONS 1-9: 0

## 2021-08-24 NOTE — PROGRESS NOTES
Medication Therapy Management (MTM) Encounter    ASSESSMENT:                            Medication Adherence/Access: See below for considerations    CF: Amparos PFTs have decreased, which Mitesh attributes to inconsistent Symbicort use and exercise. Reasonable to work on these pieces at this time. Briefly discussed Trikafta, Mitesh would prefer to wait until PFTs lower or evidence of bronchiectasis on CT.    CF Nutrition: BMI at goal of >23kg/m2. Labs currently in process, dietiticristofer [place] to follow up. Am[place]os lipid panel was also found to be elevated today, Mitesh would prefer to work on diet and exercise at this time and recheck labs in 6-12 months.    Vaccines: Need vaccine record    PLAN:                            1. Mitesh to restart Symbicort 2 puffs twice daily    2. Mitesh to work on increasing exercise to help with lung function and cholesterol levels    3. Dietiticristofer [place] to follow up with labs done today    4. Consider requesting immunization record, can abstract to [facility]    Follow-up: Return in about 1 year (around 8/24/2022) for annual.      SUBJECTIVE/OBJECTIVE:                          Mitesh Tovar is a 40 year old male coming in for a follow-up visit. He was referred to me from Dr. Ward. Today's visit is a co-visit with Dr. Ward and team. Today's visit is a follow-up MTM visit from 3/26/19.    Reason for visit: Annual Medication Review    Allergies/ADRs: Reviewed in Epic  Tobacco: No tobacco use  Alcohol: none  PMH: Reviewed in Epic  CF Genotype: [id number]    Medication Adherence  Medication adherence flowsheet 8/24/2021   Patient medication administration: Responsible for own medications   Patient estimated adherence level: 75-51%   Pharmacist assessment of adherence: Fair   Patient reported doses missed per week: 5-7   Pharmacy MPR: Not available   Facilitators to medication adherence  Schedule/routine   Patient reported barriers to medication adherence  Other   Other adherence barriers: got  "out of the habit   Adherence intervention(s): Reminders      Medication Access  Medication access flowsheet 8/24/2021   Number of pharmacies used: 1   Pharmacy: No specialty pharmacy utilized   Enrolled in [facility]? No   Patient reported barriers to accessing medications: No issues reported by patient   Medication access interventions: No issues identified      CF  Inhaled medications   Bronchodilator: albuterol HFA  - uses PRN with exercise   Mucolytic: none  Antibiotic: none  Other: Symbicort 80/4.5 mcg 2 puffs twice daily (has not been using lately)  Oral medications   Azithromycin: not indicated  CFTR modulator: eligible for Trikafta, has not taken due to good health   Other: none  Pulmonary symptoms are stable  PFTs are decreased  Current FEV1: 79%  Cultures (last growth): throat cultures grow Staph (4/20/21), rahnella aquatilis (4/20/21), and serratia marcescens (4/20/21), Klebsiella aerogenes (3/26/19)  Current exacerbation: no      CF Nutrition: Mitesh is pancreatic sufficient. He is not experiencing any signs or symptoms of GI distress.   Taking calcium and vitamin D chewable 2 chewtabs daily which he purchases at [employer]. No concerns at this time.     Recent Labs   Lab Test 08/24/21  0808 03/26/19  0730 02/03/14  0725   CHOL 214* 208* 170   HDL 44 53 43   * 129* 101   TRIG 102 135 127   CHOLHDLRATIO  --   --  3.9             Vaccines:  Most Recent Immunizations   Administered Date(s) Administered     COVID-19,PF,Terrie 05/22/2021     MMR 04/26/1995     TDAP Vaccine (Adacel) 09/23/2011     Tdap (Adacel,Boostrix) 08/24/2021       PFTs:      Weight/BMI:      Today's Vitals:   BP Readings from Last 1 Encounters:   08/24/21 120/80     Pulse Readings from Last 1 Encounters:   08/24/21 61     Wt Readings from Last 1 Encounters:   08/24/21 222 lb 6.4 oz (100.9 kg)     Ht Readings from Last 1 Encounters:   08/24/21 5' 8.23\" (1.733 m)     Estimated body mass index is 33.59 kg/m  as " "calculated from the following:    Height as of an earlier encounter on 8/24/21: 5' 8.23\" (1.733 m).    Weight as of an earlier encounter on 8/24/21: 222 lb 6.4 oz (100.9 kg).    Temp Readings from Last 1 Encounters:   09/19/18 98.1  F (36.7  C) (Oral)     ----------------      I spent 21 minutes with this patient today. I offer these suggestions for consideration by Dr. Ward during covisit.     The patient was given a summary of these recommendations. See Provider note/AVS from today.     Amalia Toth PharmD  Cystic Fibrosis MT Pharmacist  [facility]  Voicemail: [phone]               Medication Therapy Recommendations  Cystic fibrosis with pulmonary manifestations (H)    Current Medication: budesonide-formoterol (SYMBICORT) 80-4.5 MCG/ACT Inhaler   Rationale: Patient forgets to take - Adherence - Adherence   Recommendation: Provide Education   Status: Patient Agreed - Adherence/Education                "

## 2021-08-24 NOTE — PROGRESS NOTES
[facility]  August 24, 2021         Assessment and Plan:   Mitesh Tovar is a 40 year old male with cystic fibrosis.    1. CF lung disease with mild obstruction: Mitesh reports little in the way of pulmonary symptoms.  He occasionally brings up sputum in the morning that is clear in color.  He does not have a consistent cough.  He does continue to premedicate exercise with an inhaler in order to improve his tolerance.  A couple weeks ago he stopped using his Symbicort because he did not renew it.  He does think this is the reason for his decline in pulmonary function today.  Mitesh and I reviewed his chest CT that was performed in April in detail today.  There is no evidence of bronchiectasis but he does have some bronchial wall thickening.  We did discuss how we can certainly escalate his therapy but given his lack of symptoms we both agree that is probably not necessary.  He historically has grown out staph aureus, rahnella and Serratia.  His pulmonary function tests are decreased today but he believes this is secondary to his lack of inhaler use.  Today I have recommended to Mitesh:  --He should restart his Symbicort doing 2 puffs twice daily  --We did discuss the possibility of using aerobika, but he did not feel it that was necessary at this time.  --Mitesh and I once again discussed the initiation of modulator therapy.  Given his lack of symptoms or changes on his CT scan we both agreed that it would not be appropriate at this time.  --I strongly encouraged that he get back to exercising and being more consistent with his weight management.    2. Pancreatic sufficiency/GI: Mitesh has no new symptoms consistent with worsening malabsorption.    -He does not take pancreatic enzymes  - continue vitamin supplementation.    3.  Annual studies labs: Mitesh and I reviewed his annual studies labs in detail.  It does appear that he has hypercholesterolemia.  I would  recommend that this be managed by a primary care physician.    4.  Psychosocial: Mitesh does continue to work for [employer].  He reports work is going well.  He is  and in a stable relationship.    Annual studies due: due now  Immunizations: review with patient  Colonoscopy: DILCIA Ward MD MPH  Associate Professor of Medicine  Pulmonary, Allergy, Critical Care and Sleep Medicine      Interval History:     Mitesh denies any consistent cough.  He occasionally brings up clear sputum in the morning.  He denies any chest congestion or tightness.  He has no shortness of breath.  He does premedicate workouts with an inhaler.  He did stop his Symbicort 2 weeks ago.         Review of Systems:     CONSTITUTIONAL: no fever, no chills, no sweats, no change in weight, no change in energy, no change in appetite    INTEGUMENTARY/SKIN: no rash, no obvious new lesions    ENT/MOUTH: no sore throat, no new sinus pain, no new nasal drainage, no new nasal congestion, no ear ringing     RESPIRATORY: see interval history    CV: no chest pain, no palpitations, no peripheral edema    GI: no nausea, no vomiting, no change in stools, no fatty stools, no GERD    : negative    MUSCULOSKELETAL: no myalgias, no arthralgias    ENDOCRINE: negative    NEURO:  No headache, no numbness, no tingling    SLEEP: no issues    PSYCHIATRIC: mood stable--good          Past Medical and Surgical History:     Past Medical History:   Diagnosis Date     Bilateral external ear infections     frequent as a child     Congenital absence of vas deferens      Pneumonia     x 2, during college years     Seasonal allergies      Past Surgical History:   Procedure Laterality Date     MYRINGOTOMY, INSERT TUBE BILATERAL, COMBINED  1985, 1983           Family History:     Family History   Problem Relation Age of Onset     Genitourinary Problems Brother         CBAVD, no CF testing     Hypertension Mother      Hypertension Father      Diabetes Father       Cerebrovascular Disease Maternal Grandmother      Cancer Paternal Grandfather         type unknown            Social History:     Social History     Socioeconomic History     Marital status:      Spouse name: Not on file     Number of children: Not on file     Years of education: Not on file     Highest education level: Not on file   Occupational History     Occupation:    Tobacco Use     Smoking status: Never Smoker     Smokeless tobacco: Never Used   Substance and Sexual Activity     Alcohol use: Yes     Alcohol/week: 0.0 standard drinks     Drug use: No     Sexual activity: Yes     Partners: Female     Birth control/protection: Pill   Other Topics Concern     Not on file   Social History Narrative    Patient lives with his wife in a house in [place], [place].  He works full time as an .  He has minimal alcohol consumption.  No tobacco exposure.  He has no pets.  Training for a Duathlon (run, bike, run).     Social Determinants of Health     Financial Resource Strain:      Difficulty of Paying Living Expenses:    Food Insecurity:      Worried About Running Out of Food in the Last Year:      Ran Out of Food in the Last Year:    Transportation Needs:      Lack of Transportation (Medical):      Lack of Transportation (Non-Medical):    Physical Activity:      Days of Exercise per Week:      Minutes of Exercise per Session:    Stress:      Feeling of Stress :    Social Connections:      Frequency of Communication with Friends and Family:      Frequency of Social Gatherings with Friends and Family:      Attends Sikh Services:      Active Member of Clubs or Organizations:      Attends Club or Organization Meetings:      Marital Status:    Intimate Partner Violence:      Fear of Current or Ex-Partner:      Emotionally Abused:      Physically Abused:      Sexually Abused:             Medications:     Current Outpatient Medications   Medication     albuterol (PROAIR HFA/PROVENTIL HFA/VENTOLIN  "HFA) 108 (90 Base) MCG/ACT inhaler     budesonide-formoterol (SYMBICORT) 80-4.5 MCG/ACT Inhaler     calcium carbonate-vitamin D (OS-ENA) 600-400 MG-UNIT chewable tablet     No current facility-administered medications for this visit.            Physical Exam:   /80   Pulse 61   Ht 1.733 m (5' 8.23\")   Wt 100.9 kg (222 lb 6.4 oz)   SpO2 97%   BMI 33.59 kg/m      Constitutional:   Awake, alert and in no apparent distress     Eyes:   nonicteric     ENT:   Mask in place     Neck:   Supple without supraclavicular or cervical lymphadenopathy     Lungs:   Good air flow.  No crackles. No rhonchi.  No wheezes.     Cardiovascular:   Normal S1 and S2.  RRR.  No murmur, gallop or rub.     Abdomen:   NABS, soft, nontender, nondistended.      Musculoskeletal:   No edema, no digital clubbing present     Neurologic:   Alert and conversant.     Skin:   Warm, dry.  No rash on limited exam.             Data:   All laboratory and imaging data reviewed.    Cystic Fibrosis Culture  Specimen Description   Date Value Ref Range Status   04/20/2021 Throat  Final   03/26/2019 Throat  Final   12/18/2018 Throat  Final    Culture Micro   Date Value Ref Range Status   04/20/2021 Heavy growth  Normal ariel    Final   04/20/2021 Single colony  Rahnella aquatilis   (A)  Final   04/20/2021 Single colony  Serratia marcescens   (A)  Final   04/20/2021 Light growth  Staphylococcus aureus   (A)  Final        Recent Results (from the past 168 hour(s))   ALT    Collection Time: 08/24/21  8:08 AM   Result Value Ref Range    ALT 49 0 - 70 U/L   Basic metabolic panel    Collection Time: 08/24/21  8:08 AM   Result Value Ref Range    Sodium 141 133 - 144 mmol/L    Potassium 4.0 3.4 - 5.3 mmol/L    Chloride 110 (H) 94 - 109 mmol/L    Carbon Dioxide (CO2) 26 20 - 32 mmol/L    Anion Gap 5 3 - 14 mmol/L    Urea Nitrogen 18 7 - 30 mg/dL    Creatinine 0.99 0.66 - 1.25 mg/dL    Calcium 8.9 8.5 - 10.1 mg/dL    Glucose 96 70 - 99 mg/dL    GFR Estimate >90 >60 " mL/min/1.73m2   Lipid Profile    Collection Time: 08/24/21  8:08 AM   Result Value Ref Range    Cholesterol 214 (H) <200 mg/dL    Triglycerides 102 <150 mg/dL    Direct Measure HDL 44 >=40 mg/dL    LDL Cholesterol Calculated 150 (H) <=100 mg/dL    Non HDL Cholesterol 170 (H) <130 mg/dL    Patient Fasting > 8hrs? Yes    Albumin level    Collection Time: 08/24/21  8:08 AM   Result Value Ref Range    Albumin 3.9 3.4 - 5.0 g/dL   Alkaline phosphatase    Collection Time: 08/24/21  8:08 AM   Result Value Ref Range    Alkaline Phosphatase 81 40 - 150 U/L   AST    Collection Time: 08/24/21  8:08 AM   Result Value Ref Range    AST 27 0 - 45 U/L   Iron    Collection Time: 08/24/21  8:08 AM   Result Value Ref Range    Iron 81 35 - 180 ug/dL   GGT    Collection Time: 08/24/21  8:08 AM   Result Value Ref Range    GGT 28 0 - 75 U/L   Hemoglobin A1c    Collection Time: 08/24/21  8:08 AM   Result Value Ref Range    Hemoglobin A1C 4.9 0.0 - 5.6 %   IgA    Collection Time: 08/24/21  8:08 AM   Result Value Ref Range    Immunoglobulin A 291 84 - 499 mg/dL   IgG    Collection Time: 08/24/21  8:08 AM   Result Value Ref Range    Immunoglobulin G 974 610-1,616 mg/dL   IgM    Collection Time: 08/24/21  8:08 AM   Result Value Ref Range    Immunoglobulin M 72 35 - 242 mg/dL   IgE    Collection Time: 08/24/21  8:08 AM   Result Value Ref Range    Immunoglobulin E 92 0 - 114 kU/L   INR    Collection Time: 08/24/21  8:08 AM   Result Value Ref Range    INR 1.01 0.85 - 1.15   Magnesium    Collection Time: 08/24/21  8:08 AM   Result Value Ref Range    Magnesium 2.1 1.6 - 2.3 mg/dL   Phosphorus    Collection Time: 08/24/21  8:08 AM   Result Value Ref Range    Phosphorus 2.4 (L) 2.5 - 4.5 mg/dL   Protein total    Collection Time: 08/24/21  8:08 AM   Result Value Ref Range    Protein Total 7.0 6.8 - 8.8 g/dL   TSH with free T4 reflex    Collection Time: 08/24/21  8:08 AM   Result Value Ref Range    TSH 1.10 0.40 - 4.00 mU/L   Vitamin A    Collection  Time: 08/24/21  8:08 AM   Result Value Ref Range    Vitamin A 0.61 0.30 - 1.20 mg/L    Retinol Palmitate <0.02 0.00 - 0.10 mg/L    Vitamin A Interp Normal    Vitamin E    Collection Time: 08/24/21  8:08 AM   Result Value Ref Range    Vitamin E 9.6 5.5 - 18.0 mg/L    Vitamin E Gamma 2.7 0.0 - 6.0 mg/L   Vitamin D Deficiency    Collection Time: 08/24/21  8:08 AM   Result Value Ref Range    Vitamin D, Total (25-Hydroxy) 31 20 - 75 ug/L   Erythrocyte sedimentation rate auto    Collection Time: 08/24/21  8:08 AM   Result Value Ref Range    Erythrocyte Sedimentation Rate 4 0 - 15 mm/hr   CBC with platelets and differential    Collection Time: 08/24/21  8:08 AM   Result Value Ref Range    WBC Count 7.5 4.0 - 11.0 10e3/uL    RBC Count 5.34 4.40 - 5.90 10e6/uL    Hemoglobin 16.4 13.3 - 17.7 g/dL    Hematocrit 45.3 40.0 - 53.0 %    MCV 85 78 - 100 fL    MCH 30.7 26.5 - 33.0 pg    MCHC 36.2 31.5 - 36.5 g/dL    RDW 12.1 10.0 - 15.0 %    Platelet Count 236 150 - 450 10e3/uL    % Neutrophils 59 %    % Lymphocytes 25 %    % Monocytes 10 %    % Eosinophils 5 %    % Basophils 1 %    % Immature Granulocytes 0 %    NRBCs per 100 WBC 0 <1 /100    Absolute Neutrophils 4.4 1.6 - 8.3 10e3/uL    Absolute Lymphocytes 1.9 0.8 - 5.3 10e3/uL    Absolute Monocytes 0.8 0.0 - 1.3 10e3/uL    Absolute Eosinophils 0.4 0.0 - 0.7 10e3/uL    Absolute Basophils 0.1 0.0 - 0.2 10e3/uL    Absolute Immature Granulocytes 0.0 <=0.0 10e3/uL    Absolute NRBCs 0.0 10e3/uL   General PFT Lab (Please always keep checked)    Collection Time: 08/24/21  8:17 AM   Result Value Ref Range    FVC-Pred 4.98 L    FVC-Pre 4.28 L    FVC-%Pred-Pre 86 %    FEV1-Pre 3.20 L    FEV1-%Pred-Pre 79 %    FEV1FVC-Pred 82 %    FEV1FVC-Pre 75 %    FEFMax-Pred 9.81 L/sec    FEFMax-Pre 9.59 L/sec    FEFMax-%Pred-Pre 97 %    FEF2575-Pred 4.02 L/sec    FEF2575-Pre 2.40 L/sec    BSQ9263-%Pred-Pre 59 %    ExpTime-Pre 6.81 sec    FIFMax-Pre 8.57 L/sec    FEV1FEV6-Pred 82 %    FEV1FEV6-Pre 75 %    Cystic Fibrosis Culture Aerobic Bacterial    Collection Time: 08/24/21  8:45 AM    Specimen: Throat; Swab   Result Value Ref Range    Culture 4+ Normal ariel        PFT: Mild obstructive lung disease.  When compared to 4/20/2021, the FEV1 and FVC have decreased.      Pulmonary exacerbation: absent    45 minutes spent on the date of the encounter doing chart review, history and exam, documentation and further activities per the note

## 2021-08-24 NOTE — PROGRESS NOTES
I met with Mitesh to review inhaler technique, I gave him 2 spacers to use at home. I encouraged to exercise on a regular basis.

## 2021-08-24 NOTE — PATIENT INSTRUCTIONS
Cystic Fibrosis Self-Care Plan    RECOMMENDATIONS:   Mitesh, It was great to see you today  The plan from today:  --TDAP today  --restart inhaler  --Exercise!  Please take care of yourself!    YOUR GOAL:  Stay safe and well.  Enjoy the end of summer!      [facility] Nurse line:  SHAISTA Barakat sanaz Caicedoa [phone]     [facility] Fax Number:      [phone]         Cystic Fibrosis Respiratory Therapists:   Nohemy Colmenares              [phone]          Carolyn Campos   [phone]  Cystic Fibrosis Dietitians:              Zee Burns              [phone]                            Leta Escalona                        [phone]   Cystic Fibrosis Diabetes Nurse:    Belinda Kelly   [phone]    Cystic Fibrosis Social Workers:     Kristen Hughes               [phone]                     Meena De León               [phone]  Cystic Fibrosis Pharmacists:           Luz Maria Gonzalez                               [phone]         Kelsey Fairchild   [phone]  Cystic Fibrosis Genetic Counselor:   Yajaira Hughes    [phone]    [facility] website:  [email or web address]    COVID VACCINES:    You are eligible for the COVID-19 vaccine. Sign up for your COVID vaccine via [employer]. Log in, select the menu bar, select schedule an appointment, and then select COVID-19 Vaccine 1st Dose. You may also schedule by calling this number [phone] however hold times can be long.       OR schedule through the [facility] Vaccine Connector at https://vaccineconnector.[place].gov/ or by calling [phone].      The best vaccine is the one that s available to you first.  All COVID-19 vaccines currently available in the [place] (Danny & Danny, Pfizer and Moderna) have been shown to be highly effect at preventing COVID-19.       We re still learning how vaccines will affect the spread of COVID-19. After you ve been fully vaccinated  against COVID-19, you should keep taking precautions in public places like wearing a mask, staying 6 feet apart from others, and avoiding crowds and poorly ventilated spaces until we know more.    People are considered fully vaccinated:  2 weeks after their second dose in a 2-dose series, such as the Pfizer or Moderna vaccines, or  2 weeks after a single-dose vaccine, such as Danny & Danny s Terrie vaccine    If you ve been fully vaccinated:  You can gather indoors with fully vaccinated people without wearing a mask.  You can gather indoors with unvaccinated people from one other household (for example, visiting with relatives who all live together) without masks, unless any of those people or anyone they live with has an increased risk for severe illness from COVID-19.  If you ve been around someone who has COVID-19, you do not need to stay away from others or get tested unless you have symptoms.  However, if you live in a group setting (like a correctional or residential facility or group home) and are around someone who has COVID-19, you should still stay away from others for 14 days and get tested, even if you don t have symptoms.         MRN: [id number]   Clinic Date: August 23, 2021   Patient: Mitesh Tovar     Annual Studies:   IGG   Date Value Ref Range Status   03/26/2019 1,130 695 - 1,620 mg/dL Final     Insulin   Date Value Ref Range Status   11/28/2017 20.7 3 - 25 mU/L Final     Comment:     Starting 7/13/2017, insulin results will decrease by approximately 37%   compared to insulin results reported in EPIC between (12/16/2016-7/12/2017),   and should be interpreted accordingly. Insulin results reported prior to   12/16/16 are comparable to current insulin results and therefore no adjustment   is needed.       There are no preventive care reminders to display for this patient.    Pulmonary Function Tests  FEV1: amount of air you can blow out in 1 second  FVC: total amount of air you can take in and  blow out    Your Goals:         PFT Latest Ref Rng & Units 4/20/2021   FVC L 4.58   FEV1 L 3.57   FVC% % 92   FEV1% % 88          Airway Clearance: The Most Important Way to Keep Your Lungs Healthy  Vest Settings:    Hill-Rom Frequencies: 8, 9, 10 Pressure 10 Then, Frequencies 18, 19, 20 Pressure 6      RespirTech: Quick Start with Pressure of     Do each frequency for 5 minutes; Deflate vest after each frequency & cough 3 times before beginning the next setting.    Neb Therapy should be done 2 times/day.    Good Nutrition Can Improve Lung Function and Overall Health     Take ALL of your vitamins with food     Take 1/2 of your enzymes before EVERY meal/snack and the other 1/2 mid-meal/snack    Wt Readings from Last 3 Encounters:   04/20/21 99.7 kg (219 lb 14.4 oz)   03/26/19 88.5 kg (195 lb 1.7 oz)   12/18/18 88.5 kg (195 lb)       There is no height or weight on file to calculate BMI.         National CF Foundation Recommendations for BMI in CF Adults: Women: at least 22 Men: at least 23        Controlling Blood Sugars Helps Prevent Lung Infections & Improves Nutrition  Test blood sugar:     In the morning before eating (goal is )     2 hours after a meal (goal is less than 150)     When pre-meal glucose is greater than 150 add correction     At bedtime (if less than 100 eat a snack with 15 grams of carbohydrates  Last A1C Results:   Hemoglobin A1C   Date Value Ref Range Status   03/26/2019 5.5 0 - 5.6 % Final     Comment:     Normal <5.7% Prediabetes 5.7-6.4%  Diabetes 6.5% or higher - adopted from ADA   consensus guidelines.           If diabetic, measure A1C every 6 months. Goal: Under 7%    Staying Healthy    Research:  If you are interested in learning about research opportunities or have questions, please contact the CF Research Team at [phone] or [email or web address].      CF Foundation:  Compass is a personalized resource service to help you with the insurance, financial, legal and other issues  you are facing.  It's free, confidential and available to anyone with CF.  Ask your  for more information or contact Compass directly at 184-COMPASS ([phone]) or compass@cff.org, or learn more at cff.org/compass.

## 2021-08-24 NOTE — LETTER
8/24/2021         RE: Mitesh Tovar  [address] [place] [address]        Dear Colleague,    Thank you for referring your patient, Mitesh Tovar, to the [facility] [place]. Please see a copy of my visit note below.    [facility]  August 24, 2021         Assessment and Plan:   Mitesh Tovar is a 40 year old male with cystic fibrosis.    1. CF lung disease with mild obstruction: Mitesh reports little in the way of pulmonary symptoms.  He occasionally brings up sputum in the morning that is clear in color.  He does not have a consistent cough.  He does continue to premedicate exercise with an inhaler in order to improve his tolerance.  A couple weeks ago he stopped using his Symbicort because he did not renew it.  He does think this is the reason for his decline in pulmonary function today.  Mitesh and I reviewed his chest CT that was performed in April in detail today.  There is no evidence of bronchiectasis but he does have some bronchial wall thickening.  We did discuss how we can certainly escalate his therapy but given his lack of symptoms we both agree that is probably not necessary.  He historically has grown out staph aureus, rahnella and Serratia.  His pulmonary function tests are decreased today but he believes this is secondary to his lack of inhaler use.  Today I have recommended to Mitesh:  --He should restart his Symbicort doing 2 puffs twice daily  --We did discuss the possibility of using aerobika, but he did not feel it that was necessary at this time.  --Mitesh and I once again discussed the initiation of modulator therapy.  Given his lack of symptoms or changes on his CT scan we both agreed that it would not be appropriate at this time.  --I strongly encouraged that he get back to exercising and being more consistent with his weight management.    2. Pancreatic  sufficiency/GI: Mitesh has no new symptoms consistent with worsening malabsorption.    -He does not take pancreatic enzymes  - continue vitamin supplementation.    3.  Annual studies labs: Mitesh and I reviewed his annual studies labs in detail.  It does appear that he has hypercholesterolemia.  I would recommend that this be managed by a primary care physician.    4.  Psychosocial: Mitesh does continue to work for [employer].  He reports work is going well.  He is  and in a stable relationship.    Annual studies due: due now  Immunizations: review with patient  Colonoscopy: DILCIA Ward MD MPH  Associate Professor of Medicine  Pulmonary, Allergy, Critical Care and Sleep Medicine      Interval History:     Mitesh denies any consistent cough.  He occasionally brings up clear sputum in the morning.  He denies any chest congestion or tightness.  He has no shortness of breath.  He does premedicate workouts with an inhaler.  He did stop his Symbicort 2 weeks ago.         Review of Systems:     CONSTITUTIONAL: no fever, no chills, no sweats, no change in weight, no change in energy, no change in appetite    INTEGUMENTARY/SKIN: no rash, no obvious new lesions    ENT/MOUTH: no sore throat, no new sinus pain, no new nasal drainage, no new nasal congestion, no ear ringing     RESPIRATORY: see interval history    CV: no chest pain, no palpitations, no peripheral edema    GI: no nausea, no vomiting, no change in stools, no fatty stools, no GERD    : negative    MUSCULOSKELETAL: no myalgias, no arthralgias    ENDOCRINE: negative    NEURO:  No headache, no numbness, no tingling    SLEEP: no issues    PSYCHIATRIC: mood stable--good          Past Medical and Surgical History:     Past Medical History:   Diagnosis Date     Bilateral external ear infections     frequent as a child     Congenital absence of vas deferens      Pneumonia     x 2, during college years     Seasonal allergies      Past Surgical  History:   Procedure Laterality Date     MYRINGOTOMY, INSERT TUBE BILATERAL, COMBINED  1985, 1983           Family History:     Family History   Problem Relation Age of Onset     Genitourinary Problems Brother         CBAVD, no CF testing     Hypertension Mother      Hypertension Father      Diabetes Father      Cerebrovascular Disease Maternal Grandmother      Cancer Paternal Grandfather         type unknown            Social History:     Social History     Socioeconomic History     Marital status:      Spouse name: Not on file     Number of children: Not on file     Years of education: Not on file     Highest education level: Not on file   Occupational History     Occupation:    Tobacco Use     Smoking status: Never Smoker     Smokeless tobacco: Never Used   Substance and Sexual Activity     Alcohol use: Yes     Alcohol/week: 0.0 standard drinks     Drug use: No     Sexual activity: Yes     Partners: Female     Birth control/protection: Pill   Other Topics Concern     Not on file   Social History Narrative    Patient lives with his wife in a house in [place], [place].  He works full time as an .  He has minimal alcohol consumption.  No tobacco exposure.  He has no pets.  Training for a Duathlon (run, bike, run).     Social Determinants of Health     Financial Resource Strain:      Difficulty of Paying Living Expenses:    Food Insecurity:      Worried About Running Out of Food in the Last Year:      Ran Out of Food in the Last Year:    Transportation Needs:      Lack of Transportation (Medical):      Lack of Transportation (Non-Medical):    Physical Activity:      Days of Exercise per Week:      Minutes of Exercise per Session:    Stress:      Feeling of Stress :    Social Connections:      Frequency of Communication with Friends and Family:      Frequency of Social Gatherings with Friends and Family:      Attends [employer] Services:      Active Member of Clubs or Organizations:      Attends  "Club or Organization Meetings:      Marital Status:    Intimate Partner Violence:      Fear of Current or Ex-Partner:      Emotionally Abused:      Physically Abused:      Sexually Abused:             Medications:     Current Outpatient Medications   Medication     albuterol (PROAIR HFA/PROVENTIL HFA/VENTOLIN HFA) 108 (90 Base) MCG/ACT inhaler     budesonide-formoterol (SYMBICORT) 80-4.5 MCG/ACT Inhaler     calcium carbonate-vitamin D (OS-ENA) 600-400 MG-UNIT chewable tablet     No current facility-administered medications for this visit.            Physical Exam:   /80   Pulse 61   Ht 1.733 m (5' 8.23\")   Wt 100.9 kg (222 lb 6.4 oz)   SpO2 97%   BMI 33.59 kg/m      Constitutional:   Awake, alert and in no apparent distress     Eyes:   nonicteric     ENT:   Mask in place     Neck:   Supple without supraclavicular or cervical lymphadenopathy     Lungs:   Good air flow.  No crackles. No rhonchi.  No wheezes.     Cardiovascular:   Normal S1 and S2.  RRR.  No murmur, gallop or rub.     Abdomen:   NABS, soft, nontender, nondistended.      Musculoskeletal:   No edema, no digital clubbing present     Neurologic:   Alert and conversant.     Skin:   Warm, dry.  No rash on limited exam.             Data:   All laboratory and imaging data reviewed.    Cystic Fibrosis Culture  Specimen Description   Date Value Ref Range Status   04/20/2021 Throat  Final   03/26/2019 Throat  Final   12/18/2018 Throat  Final    Culture Micro   Date Value Ref Range Status   04/20/2021 Heavy growth  Normal ariel    Final   04/20/2021 Single colony  Rahnella aquatilis   (A)  Final   04/20/2021 Single colony  Serratia marcescens   (A)  Final   04/20/2021 Light growth  Staphylococcus aureus   (A)  Final        Recent Results (from the past 168 hour(s))   ALT    Collection Time: 08/24/21  8:08 AM   Result Value Ref Range    ALT 49 0 - 70 U/L   Basic metabolic panel    Collection Time: 08/24/21  8:08 AM   Result Value Ref Range    Sodium 141 " 133 - 144 mmol/L    Potassium 4.0 3.4 - 5.3 mmol/L    Chloride 110 (H) 94 - 109 mmol/L    Carbon Dioxide (CO2) 26 20 - 32 mmol/L    Anion Gap 5 3 - 14 mmol/L    Urea Nitrogen 18 7 - 30 mg/dL    Creatinine 0.99 0.66 - 1.25 mg/dL    Calcium 8.9 8.5 - 10.1 mg/dL    Glucose 96 70 - 99 mg/dL    GFR Estimate >90 >60 mL/min/1.73m2   Lipid Profile    Collection Time: 08/24/21  8:08 AM   Result Value Ref Range    Cholesterol 214 (H) <200 mg/dL    Triglycerides 102 <150 mg/dL    Direct Measure HDL 44 >=40 mg/dL    LDL Cholesterol Calculated 150 (H) <=100 mg/dL    Non HDL Cholesterol 170 (H) <130 mg/dL    Patient Fasting > 8hrs? Yes    Albumin level    Collection Time: 08/24/21  8:08 AM   Result Value Ref Range    Albumin 3.9 3.4 - 5.0 g/dL   Alkaline phosphatase    Collection Time: 08/24/21  8:08 AM   Result Value Ref Range    Alkaline Phosphatase 81 40 - 150 U/L   AST    Collection Time: 08/24/21  8:08 AM   Result Value Ref Range    AST 27 0 - 45 U/L   Iron    Collection Time: 08/24/21  8:08 AM   Result Value Ref Range    Iron 81 35 - 180 ug/dL   GGT    Collection Time: 08/24/21  8:08 AM   Result Value Ref Range    GGT 28 0 - 75 U/L   Hemoglobin A1c    Collection Time: 08/24/21  8:08 AM   Result Value Ref Range    Hemoglobin A1C 4.9 0.0 - 5.6 %   IgA    Collection Time: 08/24/21  8:08 AM   Result Value Ref Range    Immunoglobulin A 291 84 - 499 mg/dL   IgG    Collection Time: 08/24/21  8:08 AM   Result Value Ref Range    Immunoglobulin G 974 610-1,616 mg/dL   IgM    Collection Time: 08/24/21  8:08 AM   Result Value Ref Range    Immunoglobulin M 72 35 - 242 mg/dL   IgE    Collection Time: 08/24/21  8:08 AM   Result Value Ref Range    Immunoglobulin E 92 0 - 114 kU/L   INR    Collection Time: 08/24/21  8:08 AM   Result Value Ref Range    INR 1.01 0.85 - 1.15   Magnesium    Collection Time: 08/24/21  8:08 AM   Result Value Ref Range    Magnesium 2.1 1.6 - 2.3 mg/dL   Phosphorus    Collection Time: 08/24/21  8:08 AM   Result  Value Ref Range    Phosphorus 2.4 (L) 2.5 - 4.5 mg/dL   Protein total    Collection Time: 08/24/21  8:08 AM   Result Value Ref Range    Protein Total 7.0 6.8 - 8.8 g/dL   TSH with free T4 reflex    Collection Time: 08/24/21  8:08 AM   Result Value Ref Range    TSH 1.10 0.40 - 4.00 mU/L   Vitamin A    Collection Time: 08/24/21  8:08 AM   Result Value Ref Range    Vitamin A 0.61 0.30 - 1.20 mg/L    Retinol Palmitate <0.02 0.00 - 0.10 mg/L    Vitamin A Interp Normal    Vitamin E    Collection Time: 08/24/21  8:08 AM   Result Value Ref Range    Vitamin E 9.6 5.5 - 18.0 mg/L    Vitamin E Gamma 2.7 0.0 - 6.0 mg/L   Vitamin D Deficiency    Collection Time: 08/24/21  8:08 AM   Result Value Ref Range    Vitamin D, Total (25-Hydroxy) 31 20 - 75 ug/L   Erythrocyte sedimentation rate auto    Collection Time: 08/24/21  8:08 AM   Result Value Ref Range    Erythrocyte Sedimentation Rate 4 0 - 15 mm/hr   CBC with platelets and differential    Collection Time: 08/24/21  8:08 AM   Result Value Ref Range    WBC Count 7.5 4.0 - 11.0 10e3/uL    RBC Count 5.34 4.40 - 5.90 10e6/uL    Hemoglobin 16.4 13.3 - 17.7 g/dL    Hematocrit 45.3 40.0 - 53.0 %    MCV 85 78 - 100 fL    MCH 30.7 26.5 - 33.0 pg    MCHC 36.2 31.5 - 36.5 g/dL    RDW 12.1 10.0 - 15.0 %    Platelet Count 236 150 - 450 10e3/uL    % Neutrophils 59 %    % Lymphocytes 25 %    % Monocytes 10 %    % Eosinophils 5 %    % Basophils 1 %    % Immature Granulocytes 0 %    NRBCs per 100 WBC 0 <1 /100    Absolute Neutrophils 4.4 1.6 - 8.3 10e3/uL    Absolute Lymphocytes 1.9 0.8 - 5.3 10e3/uL    Absolute Monocytes 0.8 0.0 - 1.3 10e3/uL    Absolute Eosinophils 0.4 0.0 - 0.7 10e3/uL    Absolute Basophils 0.1 0.0 - 0.2 10e3/uL    Absolute Immature Granulocytes 0.0 <=0.0 10e3/uL    Absolute NRBCs 0.0 10e3/uL   General PFT Lab (Please always keep checked)    Collection Time: 08/24/21  8:17 AM   Result Value Ref Range    FVC-Pred 4.98 L    FVC-Pre 4.28 L    FVC-%Pred-Pre 86 %    FEV1-Pre 3.20  "L    FEV1-%Pred-Pre 79 %    FEV1FVC-Pred 82 %    FEV1FVC-Pre 75 %    FEFMax-Pred 9.81 L/sec    FEFMax-Pre 9.59 L/sec    FEFMax-%Pred-Pre 97 %    FEF2575-Pred 4.02 L/sec    FEF2575-Pre 2.40 L/sec    PZU8614-%Pred-Pre 59 %    ExpTime-Pre 6.81 sec    FIFMax-Pre 8.57 L/sec    FEV1FEV6-Pred 82 %    FEV1FEV6-Pre 75 %   Cystic Fibrosis Culture Aerobic Bacterial    Collection Time: 08/24/21  8:45 AM    Specimen: Throat; Swab   Result Value Ref Range    Culture 4+ Normal ariel        PFT: Mild obstructive lung disease.  When compared to 4/20/2021, the FEV1 and FVC have decreased.      Pulmonary exacerbation: absent    45 minutes spent on the date of the encounter doing chart review, history and exam, documentation and further activities per the note      CF Annual Nutrition Assessment    Reason for Assessment  Assessed during Dr. Carole Ward clinic r/t increased nutrition risk with diagnosis of CF per protocol.    Nutrition Significant PMH  Mild Lung Disease - normal lung function  Pancreatic Sufficient    Anthropometric Assessment  Height: 5' 8.23\"  IBW based on BMI 22 for females and 23 for males per [facility] recs: 69.1 kg  Today's Weight: 100.9 kg (actual weight)/222 lbs 6.4 oz   %IBW: 146%  Body mass index is 33.59 kg/m .  Comments: Pt acknowledges weight gain, states this is due to lack of attention to diet and also no exercise regimen.  Would like to lose weight, get more fit within the next 6 months.     Wt Readings from Last 10 Encounters:   04/20/21 99.7 kg (219 lb 14.4 oz)   03/26/19 88.5 kg (195 lb 1.7 oz)   12/18/18 88.5 kg (195 lb)   09/19/18 90.1 kg (198 lb 9.6 oz)   11/28/17 88 kg (194 lb 0.1 oz)   11/28/17 87.6 kg (193 lb 2 oz)   11/22/16 88.5 kg (195 lb 1.7 oz)   11/17/15 89.7 kg (197 lb 12 oz)   11/18/14 95 kg (209 lb 7 oz)   06/03/14 97.2 kg (214 lb 4.6 oz)     Diet History and Assessment  Diet Preferences/Allergies/Intolerances:  Regular    Intake Recall/Comments:  Average of 2-3 meals and a few " snacks per day.  Pt states he is eating quite a bit of candy/other sugary foods and thinks this is likely a reason for his weight gain.  For example, reports eating an evening snack of 1 pound of gummy worms.  Meals tend to be more balanced and healthier, wife is a  and cooks.     Calcium:  Intake is typically 0-1 servings per day, does not do any dairy beyond occasional cheese  Salt: Adequate, pt reports no sx of deficiency  Hydration:  Not discussed this visit  Supplements: None currently, will use some when he is training for a race    Laboratory Assessment    Vitamin A   Date Value Ref Range Status   03/26/2019 0.67 0.30 - 1.20 mg/L Final     Vitamin D Deficiency screening   Date Value Ref Range Status   03/26/2019 30 20 - 75 ug/L Final     Comment:     Season, race, dietary intake, and treatment affect the concentration of   25-hydroxy-Vitamin D. Values may decrease during winter months and increase   during summer months. Values 20-29 ug/L may indicate Vitamin D insufficiency   and values <20 ug/L may indicate Vitamin D deficiency.  Vitamin D determination is routinely performed by an immunoassay specific for   25 hydroxyvitamin D3.  If an individual is on vitamin D2 (ergocalciferol)   supplementation, please specify 25 OH vitamin D2 and D3 level determination by   LCMSMS test [id number].       Vitamin E   Date Value Ref Range Status   03/26/2019 9.0 5.5 - 18.0 mg/L Final     Comment:     (Note)  Test developed and characteristics determined by [employer]. See Compliance Statement B: [email or web address]       Iron   Date Value Ref Range Status   08/24/2021 81 35 - 180 ug/dL Final   03/26/2019 101 35 - 180 ug/dL Final     Cholesterol   Date Value Ref Range Status   08/24/2021 214 (H) <200 mg/dL Final     Comment:     Age 0-19 years  Desirable: <170 mg/dL  Borderline high:  170-199 mg/dl  High:            >199 mg/dl    Age 20 years and older  Desirable: <200 mg/dL   03/26/2019 208 (H) <200 mg/dL  Final     Comment:     Desirable:       <200 mg/dl     Triglycerides   Date Value Ref Range Status   08/24/2021 102 <150 mg/dL Final     Comment:     0-9 years:  Normal:    Less than 75 mg/dL  Borderline high:  75-99 mg/dL  High:             Greater than or equal to 100 mg/dL    0-19 years:  Normal:    Less than 90 mg/dL  Borderline high:   mg/dL  High:             Greater than or equal to 130 mg/dL    20 years and older:  Normal:    Less than 150 mg/dL  Borderline high:  150-199 mg/dL  High:             200-499 mg/dL  Very high:   Greater than or equal to 500 mg/dL   03/26/2019 135 <150 mg/dL Final     HDL Cholesterol   Date Value Ref Range Status   03/26/2019 53 >39 mg/dL Final     Direct Measure HDL   Date Value Ref Range Status   08/24/2021 44 >=40 mg/dL Final     Comment:     0-19 years:       Greater than or equal to 45 mg/dL   Low: Less than 40 mg/dL   Borderline low: 40-44 mg/dL     20 years and older:   Female: Greater than or equal to 50 mg/dL   Male:   Greater than or equal to 40 mg/dL          LDL Cholesterol Calculated   Date Value Ref Range Status   08/24/2021 150 (H) <=100 mg/dL Final     Comment:     Age 0-19 years:  Desirable: 0-110 mg/dL   Borderline high: 110-129 mg/dL   High: >= 130 mg/dL    Age 20 years and older:  Desirable: <100mg/dL  Above desirable: 100-129 mg/dL   Borderline high: 130-159 mg/dL   High: 160-189 mg/dL   Very high: >= 190 mg/dL   03/26/2019 129 (H) <100 mg/dL Final     Comment:     Above desirable:  100-129 mg/dl  Borderline High:  130-159 mg/dL  High:             160-189 mg/dL  Very high:       >189 mg/dl       VLDL-Cholesterol   Date Value Ref Range Status   02/03/2014 25 0 - 30 mg/dL Final     Cholesterol/HDL Ratio   Date Value Ref Range Status   02/03/2014 3.9 0.0 - 5.0 Final     Annual studies done today;  Vitamin levels and DEXA pending. Did not do OGTT.  Last OGTT in 2017 was normal.     Current Vitamin/Mineral Supplements: (none)    NUTRITION DIAGNOSIS  Excessive  oral intake r/t unintended weight gain AEB pt reports inattention to calorie intake including large candy snacks with corresponding weight increase of 12.4 kg/27 lbs in 17 months.  INTERVENTIONS/RECOMMENDATIONS  1) Oral Intake/Weight:  Discussed strategies for weight reduction and transition to lower sugar, higher fiber diet with emphasis on healthy fats and whole foods.  Recommended weight reduction initially to BMI <30 through better dietary choices and increased physical activity. Also discussed potential need for statin therapy in the future pending changes in LDL levels; he will likely do this through PCP clinic.   [id number] kcals/day =  25-30 kcals/kg (subtract a further 500 kcals/day pending wt loss progress)   g protein/day = 1-1.3 g/kg  2) Vitamin/Mineral Supplementation: Awaiting results of vitamin levels taken today, will contact pt via [facility] with recommendations pending results.  Will also monitor for results of DEXA (currently pending).    Patient Understanding: Good  Expected Compliance: Good  Follow-Up Plans: Per protocol    GOALS:  1) Begin calorie reduction and exercise program in the interest of weight loss/fat loss within the next 30 days.     2) Initial reduction to BMI <30 within 6 months.     FOLLOW-UP/MONITORING:  Visit patient within 12 month(s) for annual review.  Weight and lipid panel check in 6 months.    Time Spent In Face-to-Face Patient Interactions: 15 minutes      Leta Escalona MS, RD, LD (pager [phone])  Cystic Fibrosis/Lung Transplant Dietitian      -Available Mon-Thurs 8 AM-4:30 PM. On Fridays please contact pager [phone] (Zee Burns RD) and on weekends/holidays contact coverage dietitian at pager [phone] (inpatient use only).     I met with Mitesh to review inhaler technique, I gave him 2 spacers to use at home. I encouraged to exercise on a regular basis.      Again, thank you for allowing me to participate in the care of your patient.         Sincerely,        Carole Ward MD

## 2021-08-24 NOTE — PROGRESS NOTES
Adult Cystic Fibrosis Program  Annual Psychosocial Assessment    Presenting Information:  Mitesh is a 40-year-old male with cystic fibrosis, presenting in CF clinic for a regular/yearly follow up with primary CF provider, Dr. Ward.  Met with Mitesh to complete an updated annual psychosocial assessment.     Living situation:  Mitesh lives with his wife Ashley in [place], [place].  They own their home and have an association with community pool.  They do not have pets.  He denies any concerns about his living situation.      Family Constellation:  Mitesh was raised by his biological parents in [place].  He is the youngest child and has 4 siblings: 3 brothers and 1 sister.  He is  to Ashley and has one son: Rayshawn age 25 whom he adopted and is Ashley's biological son from a previous relationship.  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  Mitesh's parents continue to live in [place], he reports that his dad is 81 and does have difficulty with mobility, his mom is 76 and although is in good health, is now having to be a caregiver to Mitesh's dad.  His [place].     Social Support:  Mitesh reports good social support.  He gets along well with family members and draws additional support from friends.  He describes his wife Ashley and son Rayshawn as very supportive.  He reports having a very close family and has a group of high school friends that are still close.  He has a brother who also has CBAVD but he does not believe that he has been seen for a CF work-up although he has encouraged him to, otherwise he does not have any connections to the CF community.  He reports that his Sabianism sebastian is an important part of his life.      Adjustment to Illness:  Mitesh  was diagnosed with CF during a fertility work-up, at the age of 32 (2013).  He reports that  "this encounter was not a very good experience as he felt the Urologist was not sensitive when he delivered the information.  He felt that after he told this doctor that he was not interested in IVF due to his Sabianism beliefs he eric that he was judged and not understood.  He reports no medical complications while growing up other than frequent ear infections and mild asthma symptoms.  He participated in many sports growing up including baseball and wrestling, he tried to play baseball in college but reports that \"it didn't work out\".    Mitesh describes his current health status as \"good, except I need to start exercising and lose some weight\".  Clinically, he has CF lung disease with mild obstruction.  He is pancreatic sufficient and does not take enzymes.  Recently he has not been using his inhaler because he did not renew it, he plans to resume using it and has a goal to begin exercising again.  In the past, he has ran for exercise and participated in marathons, but hasn't been running for a few years now.  He does not do bronchial drainage therapy, he does not own a vest.  He is a candidate for [employer] but is not interested in starting at this time.. He denies any health problems that interfere with activities of daily living.  Mitesh is currently seen yearly in the CF clinic but is being asked to follow-up in 6 months.      Health Care Directive:  Mitesh has not previously received Health Care Directive education.  This SW provided/reviewed education, including concept/purpose of health care directive, default health care agents and how to complete a directive.   Mitesh is comfortable with default health care agent(s) (his wife-Ashley) in absence of a directive. He is not currently interested in reviewing a health care directive.      Education:  Mitesh completed high school, he graduated from [employer].  He attended the [employer] and earned a degree in Accounting.  " "    Employment:  Mitesh is employed full-time as  at [employer].  He reports that work is going well now and they are doing better financially but earlier in the pandemic business was not good and things were very stressful.  He did work from home during the pandemic but is now going into the office.   He reports a supportive work environment and denies any employment concerns.  He is benefits eligible for health, short and long-term disability.  Mitesh s spouse (Ashley) is employed as a .    Finances:  Mitesh and Ashley receive income from wages and denies any financial concerns.     Insurance:  Mitesh is insured through employer-based health insurance through [employer].  He denies any insurance concerns.      Mental Health/Coping:  Mitesh denies any current or past symptoms indicative of mood, anxiety, eating, learning or other mental health disorder.  Today he describes his mood as \"good\".  Mitesh reports generally low stress levels.  Mitesh identifies Alevism sebastian as important in his life.    Mitesh completed the following screens today:  PHQ-9: Score 0: Indicating no symptoms of depression and described as not difficult at all in functioning.  ROLLY-7: Score 0: Indicating no symptoms of anxiety and described as not difficult at all in functioning.      Mitesh agreed with the scoring above and denied any symptoms not covered on these screens.    Chemical Health:  Mitesh denies tobacco or illicit drug use.  He is not exposed to secondhand smoke.  He reports that he does not drink much alcohol, adding that he does not tolerate it well and so he chooses to mostly avoid it although did drink more often in college.      Leisure Activities/Interests:   Mitesh enjoys golfing and is in a curling club.  He is hoping to get back into running.       Intervention:  -Psychosocial Assessment  -Mental Health Screening    Assessment:  Mitesh appeared to be open in his responses.  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  He remains stable from a physical health and psychosocial perspective with access to relevant resources and supports.  His goals this year are to lose weight and start exercising again in hopes of improving his lung function and cholesterol.  No other concerns expressed/noted.      Plan:  Re-consult for any psychosocial needs that may arise.    Complete psychosocial assessment annually.    TRAN Guevara, [facility]  Adult Cystic Fibrosis   Ph: [phone]  Pager: [phone]

## 2021-08-24 NOTE — PROGRESS NOTES
"CF Annual Nutrition Assessment    Reason for Assessment  Assessed during Dr. Carole Ward clinic r/t increased nutrition risk with diagnosis of CF per protocol.    Nutrition Significant PMH  Mild Lung Disease - normal lung function  Pancreatic Sufficient    Anthropometric Assessment  Height: 5' 8.23\"  IBW based on BMI 22 for females and 23 for males per CF Foundation recs: 69.1 kg  Today's Weight: 100.9 kg (actual weight)/222 lbs 6.4 oz   %IBW: 146%  Body mass index is 33.59 kg/m .  Comments: Pt acknowledges weight gain, states this is due to lack of attention to diet and also no exercise regimen.  Would like to lose weight, get more fit within the next 6 months.     Wt Readings from Last 10 Encounters:   04/20/21 99.7 kg (219 lb 14.4 oz)   03/26/19 88.5 kg (195 lb 1.7 oz)   12/18/18 88.5 kg (195 lb)   09/19/18 90.1 kg (198 lb 9.6 oz)   11/28/17 88 kg (194 lb 0.1 oz)   11/28/17 87.6 kg (193 lb 2 oz)   11/22/16 88.5 kg (195 lb 1.7 oz)   11/17/15 89.7 kg (197 lb 12 oz)   11/18/14 95 kg (209 lb 7 oz)   06/03/14 97.2 kg (214 lb 4.6 oz)     Diet History and Assessment  Diet Preferences/Allergies/Intolerances:  Regular    Intake Recall/Comments:  Average of 2-3 meals and a few snacks per day.  Pt states he is eating quite a bit of candy/other sugary foods and thinks this is likely a reason for his weight gain.  For example, reports eating an evening snack of 1 pound of gummy worms.  Meals tend to be more balanced and healthier, wife is a  and cooks.     Calcium:  Intake is typically 0-1 servings per day, does not do any dairy beyond occasional cheese  Salt: Adequate, pt reports no sx of deficiency  Hydration:  Not discussed this visit  Supplements: None currently, will use some when he is training for a race    Laboratory Assessment    Vitamin A   Date Value Ref Range Status   03/26/2019 0.67 0.30 - 1.20 mg/L Final     Vitamin D Deficiency screening   Date Value Ref Range Status   03/26/2019 30 20 - 75 " ug/L Final     Comment:     Season, race, dietary intake, and treatment affect the concentration of   25-hydroxy-Vitamin D. Values may decrease during winter months and increase   during summer months. Values 20-29 ug/L may indicate Vitamin D insufficiency   and values <20 ug/L may indicate Vitamin D deficiency.  Vitamin D determination is routinely performed by an immunoassay specific for   25 hydroxyvitamin D3.  If an individual is on vitamin D2 (ergocalciferol)   supplementation, please specify 25 OH vitamin D2 and D3 level determination by   LCMSMS test VITD23.       Vitamin E   Date Value Ref Range Status   03/26/2019 9.0 5.5 - 18.0 mg/L Final     Comment:     (Note)  Test developed and characteristics determined by [facility]. See Compliance Statement B: [email or web address]       Iron   Date Value Ref Range Status   08/24/2021 81 35 - 180 ug/dL Final   03/26/2019 101 35 - 180 ug/dL Final     Cholesterol   Date Value Ref Range Status   08/24/2021 214 (H) <200 mg/dL Final     Comment:     Age 0-19 years  Desirable: <170 mg/dL  Borderline high:  170-199 mg/dl  High:            >199 mg/dl    Age 20 years and older  Desirable: <200 mg/dL   03/26/2019 208 (H) <200 mg/dL Final     Comment:     Desirable:       <200 mg/dl     Triglycerides   Date Value Ref Range Status   08/24/2021 102 <150 mg/dL Final     Comment:     0-9 years:  Normal:    Less than 75 mg/dL  Borderline high:  75-99 mg/dL  High:             Greater than or equal to 100 mg/dL    0-19 years:  Normal:    Less than 90 mg/dL  Borderline high:   mg/dL  High:             Greater than or equal to 130 mg/dL    20 years and older:  Normal:    Less than 150 mg/dL  Borderline high:  150-199 mg/dL  High:             200-499 mg/dL  Very high:   Greater than or equal to 500 mg/dL   03/26/2019 135 <150 mg/dL Final     HDL Cholesterol   Date Value Ref Range Status   03/26/2019 53 >39 mg/dL Final     Direct Measure HDL   Date Value Ref Range Status    08/24/2021 44 >=40 mg/dL Final     Comment:     0-19 years:       Greater than or equal to 45 mg/dL   Low: Less than 40 mg/dL   Borderline low: 40-44 mg/dL     20 years and older:   Female: Greater than or equal to 50 mg/dL   Male:   Greater than or equal to 40 mg/dL          LDL Cholesterol Calculated   Date Value Ref Range Status   08/24/2021 150 (H) <=100 mg/dL Final     Comment:     Age 0-19 years:  Desirable: 0-110 mg/dL   Borderline high: 110-129 mg/dL   High: >= 130 mg/dL    Age 20 years and older:  Desirable: <100mg/dL  Above desirable: 100-129 mg/dL   Borderline high: 130-159 mg/dL   High: 160-189 mg/dL   Very high: >= 190 mg/dL   03/26/2019 129 (H) <100 mg/dL Final     Comment:     Above desirable:  100-129 mg/dl  Borderline High:  130-159 mg/dL  High:             160-189 mg/dL  Very high:       >189 mg/dl       VLDL-Cholesterol   Date Value Ref Range Status   02/03/2014 25 0 - 30 mg/dL Final     Cholesterol/HDL Ratio   Date Value Ref Range Status   02/03/2014 3.9 0.0 - 5.0 Final     Annual studies done today;  Vitamin levels and DEXA pending. Did not do OGTT.  Last OGTT in 2017 was normal.     Current Vitamin/Mineral Supplements: (none)    NUTRITION DIAGNOSIS  Excessive oral intake r/t unintended weight gain AEB pt reports inattention to calorie intake including large candy snacks with corresponding weight increase of 12.4 kg/27 lbs in 17 months.  INTERVENTIONS/RECOMMENDATIONS  1) Oral Intake/Weight:  Discussed strategies for weight reduction and transition to lower sugar, higher fiber diet with emphasis on healthy fats and whole foods.  Recommended weight reduction initially to BMI <30 through better dietary choices and increased physical activity. Also discussed potential need for statin therapy in the future pending changes in LDL levels; he will likely do this through PCP clinic.   7600-0629 kcals/day =  25-30 kcals/kg (subtract a further 500 kcals/day pending wt loss progress)   g protein/day  = 1-1.3 g/kg  2) Vitamin/Mineral Supplementation: Awaiting results of vitamin levels taken today, will contact pt via [facility] with recommendations pending results.  Will also monitor for results of DEXA (currently pending).    Patient Understanding: Good  Expected Compliance: Good  Follow-Up Plans: Per protocol    GOALS:  1) Begin calorie reduction and exercise program in the interest of weight loss/fat loss within the next 30 days.     2) Initial reduction to BMI <30 within 6 months.     FOLLOW-UP/MONITORING:  Visit patient within 12 month(s) for annual review.  Weight and lipid panel check in 6 months.    Time Spent In Face-to-Face Patient Interactions: 15 minutes      Leta Escalona MS, RD, LD (pager [phone])  Cystic Fibrosis/Lung Transplant Dietitian      -Available Mon-Thurs 8 AM-4:30 PM. On Fridays please contact pager [phone] (Zee Burns RD) and on weekends/holidays contact coverage dietitian at pager [phone] (inpatient use only).

## 2021-08-24 NOTE — NURSING NOTE
Chief Complaint   Patient presents with     Cystic Fibrosis     follow up     Vitals were taken and medications were reconciled.     COLUMBA Mcelroy

## 2021-08-24 NOTE — PATIENT INSTRUCTIONS
See provider AVS for a summary of recommendations from today's visit.    Amalia Toth, PharmD  Cystic Fibrosis MTM Pharmacist  [facility]  Voicemail: [phone]

## 2021-08-25 LAB — IGE SERPL-ACNC: 92 KU/L (ref 0–114)

## 2021-08-25 ASSESSMENT — ASTHMA QUESTIONNAIRES: ACT_TOTALSCORE: 25

## 2021-08-25 ASSESSMENT — ANXIETY QUESTIONNAIRES: GAD7 TOTAL SCORE: 0

## 2021-08-26 LAB
A-TOCOPHEROL VIT E SERPL-MCNC: 9.6 MG/L
ANNOTATION COMMENT IMP: NORMAL
BETA+GAMMA TOCOPHEROL SERPL-MCNC: 2.7 MG/L
RETINYL PALMITATE SERPL-MCNC: <0.02 MG/L
VIT A SERPL-MCNC: 0.61 MG/L

## 2021-08-27 LAB — TESTOST SERPL-MCNC: 442 NG/DL (ref 240–950)

## 2021-08-29 LAB — BACTERIA SPEC CULT: NORMAL

## 2021-08-30 ENCOUNTER — CARE COORDINATION (OUTPATIENT)
Dept: PULMONOLOGY | Facility: CLINIC | Age: 41
End: 2021-08-30

## 2021-08-30 NOTE — PROGRESS NOTES
Called patient per Dr. Ward request to discuss low bone density on recent DEXA scan. Notified patient that [facility] will be contacting him to discuss vitamin d dosing. Exercise has already been discussed for weight loss, and weight bearing exercise will also be beneficial to his bone health. Also encouraged patient to inform his PCP of these finds when he discusses his cholesterol with them, to keep them in the loop.  Patient verbalized understanding and agreement in plan.

## 2021-09-21 LAB — BACTERIA BLD CULT: NO GROWTH

## 2021-10-23 ENCOUNTER — HEALTH MAINTENANCE LETTER (OUTPATIENT)
Age: 41
End: 2021-10-23

## 2021-12-13 ENCOUNTER — CARE COORDINATION (OUTPATIENT)
Dept: PULMONOLOGY | Facility: CLINIC | Age: 41
End: 2021-12-13
Payer: COMMERCIAL

## 2021-12-14 NOTE — PROGRESS NOTES
Was patient tested for COVID: Yes  Reason for testing: Symptoms consistent with COVID-19  Testing date: 11/6/21  Type of test: Nasopharyngeal swab (antigen)  Testing result: Positive   Symptomatic: Yes  Date of first symptom: 11/6/21  S/sx: Chills, body aches, slight cough  Disposition: Self-Quarantine at home  If at Self-quarantine at home - any treatment needed: n/a    Recovery date: 11/9/21

## 2022-02-12 ENCOUNTER — HEALTH MAINTENANCE LETTER (OUTPATIENT)
Age: 42
End: 2022-02-12

## 2022-06-06 DIAGNOSIS — E84.9 CF (CYSTIC FIBROSIS) (H): ICD-10-CM

## 2022-06-06 RX ORDER — BUDESONIDE AND FORMOTEROL FUMARATE DIHYDRATE 80; 4.5 UG/1; UG/1
2 AEROSOL RESPIRATORY (INHALATION) 2 TIMES DAILY
Qty: 10.2 G | Refills: 11 | Status: SHIPPED | OUTPATIENT
Start: 2022-06-06 | End: 2022-06-16

## 2022-06-16 DIAGNOSIS — E84.9 CF (CYSTIC FIBROSIS) (H): ICD-10-CM

## 2022-06-16 RX ORDER — BUDESONIDE AND FORMOTEROL FUMARATE DIHYDRATE 80; 4.5 UG/1; UG/1
2 AEROSOL RESPIRATORY (INHALATION) 2 TIMES DAILY
Qty: 10.2 G | Refills: 11 | Status: SHIPPED | OUTPATIENT
Start: 2022-06-16

## 2022-06-23 ENCOUNTER — TELEPHONE (OUTPATIENT)
Dept: PULMONOLOGY | Facility: CLINIC | Age: 42
End: 2022-06-23

## 2022-06-23 NOTE — TELEPHONE ENCOUNTER
PRIOR AUTHORIZATION DENIED    Medication: Symbicort - insurance requires brand     Denial Date: 6/23/2022    Denial Rational: Insurance requires brand Symbicort

## 2022-06-23 NOTE — TELEPHONE ENCOUNTER
PA Initiation    Medication: Symbicort PA pending  Insurance Company: [employer] - Phone [phone] Fax [phone]  Pharmacy Filling the Rx: [facility] [address] [place] [address] [id number] [facility]  Filling Pharmacy Phone:    Filling Pharmacy Fax:    Start Date: 6/23/2022    [facility] [id number] [facility] [id number] [facility]

## 2022-09-26 NOTE — PATIENT INSTRUCTIONS
Cystic Fibrosis Self-Care Plan    RECOMMENDATIONS:   Help us provide the best possible care. If you receive a questionnaire from the CF Foundation about your clinic experience today please fill it out.  It should take less than 5 minutes. Let us know what we are doing well and how we can improve.  Mitesh, It was great to see you today.  The plan from today:  --in the next 6 months work on:    --nutrition--goal weight 205   --more exercise   --colonoscopy   --decrease your candy be 50%  --Great job with symbicort use and as needed albuterol  --labs in 6 months    YOUR GOAL:  Stay safe and well.  Enjoy the fall weather!      [facility] Nurse line:  Lea Barakat  [phone]     [facility] Fax Number:      [phone]         Cystic Fibrosis Respiratory Therapists:   Darya Calderon                          [phone]          Carolyn Campos   [phone]  Cystic Fibrosis Dietitians:              eZe Burns              [phone]                            Leta Escalona                        [phone]   Cystic Fibrosis Diabetes Nurse:    Belinda Kelly               [phone]    Cystic Fibrosis Social Workers:     Kristen Hughes               [phone]                     Meena De León               [phone]  Cystic Fibrosis Pharmacists:           Trudi Tony                              [phone] (pager)         Amalia Toth      [phone]   Cystic Fibrosis Genetic Counselor:   Yajaira Hughes    [phone]    [facility] website:  [email or web address]    COVID VACCINES:    You are eligible for the COVID-19 vaccine. Sign up for your COVID vaccine via [facility]. Log in, select the menu bar, select schedule an appointment, and then select COVID-19 Vaccine 1st Dose. You may also schedule by calling this number [phone] however hold times can be long.       OR schedule through the [facility] Vaccine  [place] at https://vaccineconnector.[place].gov/ or by calling [phone].      The best vaccine is the one that s available to you first.  All COVID-19 vaccines currently available in the [place] (Danny & Danny, Pfizer and Moderna) have been shown to be highly effect at preventing COVID-19.       We re still learning how vaccines will affect the spread of COVID-19. After you ve been fully vaccinated against COVID-19, you should keep taking precautions in public places like wearing a mask, staying 6 feet apart from others, and avoiding crowds and poorly ventilated spaces until we know more.       MRN: [id number]   Clinic Date: September 26, 2022   Patient: Mitesh Tovar     Annual Studies:   IGG   Date Value Ref Range Status   03/26/2019 1,130 695 - 1,620 mg/dL Final     Immunoglobulin G   Date Value Ref Range Status   08/24/2021 974 610-1,616 mg/dL Final     Insulin   Date Value Ref Range Status   11/28/2017 20.7 3 - 25 mU/L Final     Comment:     Starting 7/13/2017, insulin results will decrease by approximately 37%   compared to insulin results reported in EPIC between (12/16/2016-7/12/2017),   and should be interpreted accordingly. Insulin results reported prior to   12/16/16 are comparable to current insulin results and therefore no adjustment   is needed.       There are no preventive care reminders to display for this patient.    Pulmonary Function Tests  FEV1: amount of air you can blow out in 1 second  FVC: total amount of air you can take in and blow out    Your Goals:         PFT Latest Ref Rng & Units 8/24/2021   FVC L 4.28   FEV1 L 3.20   FVC% % 86   FEV1% % 79          Airway Clearance: The Most Important Way to Keep Your Lungs Healthy  Vest Settings:   Hill-Rom Frequencies: 8, 9, 10 Pressure 10 Then, Frequencies 18, 19, 20 Pressure 6     RespirTech: Quick Start with Pressure of     Do each frequency for 5 minutes; Deflate vest after each frequency & cough 3 times before beginning the  next setting.    Vest and Neb Therapy should be done 1 times/day.    Good Nutrition Can Improve Lung Function and Overall Health    Take ALL of your vitamins with food    Take 1/2 of your enzymes before EVERY meal/snack and the other 1/2 mid-meal/snack    Wt Readings from Last 3 Encounters:   08/24/21 100.9 kg (222 lb 6.4 oz)   04/20/21 99.7 kg (219 lb 14.4 oz)   03/26/19 88.5 kg (195 lb 1.7 oz)       There is no height or weight on file to calculate BMI.         National CF Foundation Recommendations for BMI in CF Adults: Women: at least 22 Men: at least 23        Controlling Blood Sugars Helps Prevent Lung Infections & Improves Nutrition  Test blood sugar:    In the morning before eating (goal is )    2 hours after a meal (goal is less than 150)    When pre-meal glucose is greater than 150 add correction    At bedtime (if less than 100 eat a snack with 15 grams of carbohydrates  Last A1C Results:   Hemoglobin A1C   Date Value Ref Range Status   08/24/2021 4.9 0.0 - 5.6 % Final     Comment:     Normal <5.7%   Prediabetes 5.7-6.4%    Diabetes 6.5% or higher     Note: Adopted from ADA consensus guidelines.   03/26/2019 5.5 0 - 5.6 % Final     Comment:     Normal <5.7% Prediabetes 5.7-6.4%  Diabetes 6.5% or higher - adopted from ADA   consensus guidelines.           If diabetic, measure A1C every 6 months. Goal: Under 7%    Staying Healthy  Research:  If you are interested in learning about research opportunities or have questions, please contact the CF Research Team at [phone] or [email or web address].    CF Foundation:  Compass is a personalized resource service to help you with the insurance, financial, legal and other issues you are facing.  It's free, confidential and available to anyone with CF.  Ask your  for more information or contact Compass directly at 523-MSMZJNY ([phone]) or compass@cff.org, or learn more at cff.org/compass.

## 2022-09-28 ENCOUNTER — OFFICE VISIT (OUTPATIENT)
Dept: PULMONOLOGY | Facility: CLINIC | Age: 42
End: 2022-09-28
Attending: PHYSICIAN ASSISTANT
Payer: COMMERCIAL

## 2022-09-28 ENCOUNTER — ALLIED HEALTH/NURSE VISIT (OUTPATIENT)
Dept: CARE COORDINATION | Facility: CLINIC | Age: 42
End: 2022-09-28

## 2022-09-28 VITALS
DIASTOLIC BLOOD PRESSURE: 87 MMHG | OXYGEN SATURATION: 97 % | HEIGHT: 68 IN | BODY MASS INDEX: 34.72 KG/M2 | SYSTOLIC BLOOD PRESSURE: 128 MMHG | HEART RATE: 66 BPM | WEIGHT: 229.06 LBS

## 2022-09-28 DIAGNOSIS — Z13.9 RISK AND FUNCTIONAL ASSESSMENT: Primary | ICD-10-CM

## 2022-09-28 DIAGNOSIS — Z12.11 ENCOUNTER FOR SCREENING COLONOSCOPY: ICD-10-CM

## 2022-09-28 DIAGNOSIS — E84.9 CYSTIC FIBROSIS (H): ICD-10-CM

## 2022-09-28 DIAGNOSIS — E84.0 CYSTIC FIBROSIS WITH PULMONARY MANIFESTATIONS (H): Primary | ICD-10-CM

## 2022-09-28 DIAGNOSIS — E84.9 CF (CYSTIC FIBROSIS) (H): Primary | ICD-10-CM

## 2022-09-28 LAB
EXPTIME-PRE: 7 SEC
FEF2575-%PRED-PRE: 55 %
FEF2575-PRE: 2.22 L/SEC
FEF2575-PRED: 3.98 L/SEC
FEFMAX-%PRED-PRE: 114 %
FEFMAX-PRE: 11.26 L/SEC
FEFMAX-PRED: 9.8 L/SEC
FEV1-%PRED-PRE: 80 %
FEV1-PRE: 3.23 L
FEV1FEV6-PRE: 74 %
FEV1FEV6-PRED: 82 %
FEV1FVC-PRE: 74 %
FEV1FVC-PRED: 81 %
FIFMAX-PRE: 7.71 L/SEC
FVC-%PRED-PRE: 88 %
FVC-PRE: 4.39 L
FVC-PRED: 4.96 L

## 2022-09-28 PROCEDURE — 87070 CULTURE OTHR SPECIMN AEROBIC: CPT | Performed by: INTERNAL MEDICINE

## 2022-09-28 PROCEDURE — G0463 HOSPITAL OUTPT CLINIC VISIT: HCPCS | Mod: 25

## 2022-09-28 PROCEDURE — 99214 OFFICE O/P EST MOD 30 MIN: CPT | Mod: 25 | Performed by: INTERNAL MEDICINE

## 2022-09-28 PROCEDURE — 94375 RESPIRATORY FLOW VOLUME LOOP: CPT | Performed by: INTERNAL MEDICINE

## 2022-09-28 ASSESSMENT — ANXIETY QUESTIONNAIRES
1. FEELING NERVOUS, ANXIOUS, OR ON EDGE: NOT AT ALL
6. BECOMING EASILY ANNOYED OR IRRITABLE: NOT AT ALL
GAD7 TOTAL SCORE: 0
GAD7 TOTAL SCORE: 0
7. FEELING AFRAID AS IF SOMETHING AWFUL MIGHT HAPPEN: NOT AT ALL
2. NOT BEING ABLE TO STOP OR CONTROL WORRYING: NOT AT ALL
3. WORRYING TOO MUCH ABOUT DIFFERENT THINGS: NOT AT ALL
5. BEING SO RESTLESS THAT IT IS HARD TO SIT STILL: NOT AT ALL
IF YOU CHECKED OFF ANY PROBLEMS ON THIS QUESTIONNAIRE, HOW DIFFICULT HAVE THESE PROBLEMS MADE IT FOR YOU TO DO YOUR WORK, TAKE CARE OF THINGS AT HOME, OR GET ALONG WITH OTHER PEOPLE: NOT DIFFICULT AT ALL

## 2022-09-28 ASSESSMENT — PATIENT HEALTH QUESTIONNAIRE - PHQ9
SUM OF ALL RESPONSES TO PHQ QUESTIONS 1-9: 0
5. POOR APPETITE OR OVEREATING: NOT AT ALL

## 2022-09-28 ASSESSMENT — PAIN SCALES - GENERAL: PAINLEVEL: NO PAIN (0)

## 2022-09-28 NOTE — PROGRESS NOTES
"CF Annual Nutrition Assessment    Reason for Assessment  Assessed during Dr. Carole Ward clinic r/t increased nutrition risk with diagnosis of CF per protocol.    Nutrition Significant PMH  Mild Lung Disease - normal lung function  Pancreatic Sufficient    Anthropometric Assessment  Height: 5' 8.11\"  IBW based on BMI 22 for females and 23 for males per [facility] recs: 69.1 kg  Today's Weight: 103.9 kg (actual weight)/229 lbs .93 oz   %IBW: 150%  Body mass index is 34.72 kg/m .  Comments: Pt acknowledges weight gain, states this is due to lack of attention to diet and also no exercise regimen.  Would like to lose weight, get more fit within the next 6 months.     Wt Readings from Last 10 Encounters:   09/28/22 103.9 kg (229 lb 0.9 oz)   08/24/21 100.9 kg (222 lb 6.4 oz)   04/20/21 99.7 kg (219 lb 14.4 oz)   03/26/19 88.5 kg (195 lb 1.7 oz)   12/18/18 88.5 kg (195 lb)   09/19/18 90.1 kg (198 lb 9.6 oz)   11/28/17 88 kg (194 lb 0.1 oz)   11/28/17 87.6 kg (193 lb 2 oz)   11/22/16 88.5 kg (195 lb 1.7 oz)   11/17/15 89.7 kg (197 lb 12 oz)     Diet History and Assessment  Diet Preferences/Allergies/Intolerances:  Regular    Intake Recall/Comments:  Average of 2-3 meals and a few snacks per day.  Pt states he is eating quite a bit of candy/other sugary foods and thinks this is likely a reason for his weight gain. Tends to snack between meals, this is something he would like to start working on. For example, reports eating an evening snack of 1 pound of gummy worms.  Meals tend to be more balanced and healthier, wife is a  and cooks often.    Calcium:  Intake is typically 0-1 servings per day, does not do any dairy beyond occasional cheese  Salt: Adequate, pt reports no sx of deficiency  Hydration:  Not discussed this visit  Supplements: None currently, will use some when he is training for a race    Laboratory Assessment    Vitamin A   Date Value Ref Range Status   08/24/2021 0.61 0.30 - 1.20 mg/L " Final   03/26/2019 0.67 0.30 - 1.20 mg/L Final     Vitamin D Deficiency screening   Date Value Ref Range Status   03/26/2019 30 20 - 75 ug/L Final     Comment:     Season, race, dietary intake, and treatment affect the concentration of   25-hydroxy-Vitamin D. Values may decrease during winter months and increase   during summer months. Values 20-29 ug/L may indicate Vitamin D insufficiency   and values <20 ug/L may indicate Vitamin D deficiency.  Vitamin D determination is routinely performed by an immunoassay specific for   25 hydroxyvitamin D3.  If an individual is on vitamin D2 (ergocalciferol)   supplementation, please specify 25 OH vitamin D2 and D3 level determination by   LCMSMS test [id number].       Vitamin D, Total (25-Hydroxy)   Date Value Ref Range Status   08/24/2021 31 20 - 75 ug/L Final     Vitamin E   Date Value Ref Range Status   08/24/2021 9.6 5.5 - 18.0 mg/L Final     Comment:       This test was developed and its performance characteristics   determined by [facility]. It has not been cleared or   approved by the [place] Food and Drug Administration. This test   was performed in a CLIA certified laboratory and is   intended for clinical purposes.   03/26/2019 9.0 5.5 - 18.0 mg/L Final     Comment:     (Note)  Test developed and characteristics determined by [facility]. See Compliance Statement B: [facility]CS       Iron   Date Value Ref Range Status   08/24/2021 81 35 - 180 ug/dL Final   03/26/2019 101 35 - 180 ug/dL Final     Cholesterol   Date Value Ref Range Status   08/24/2021 214 (H) <200 mg/dL Final     Comment:     Age 0-19 years  Desirable: <170 mg/dL  Borderline high:  170-199 mg/dl  High:            >199 mg/dl    Age 20 years and older  Desirable: <200 mg/dL   03/26/2019 208 (H) <200 mg/dL Final     Comment:     Desirable:       <200 mg/dl     Triglycerides   Date Value Ref Range Status   08/24/2021 102 <150 mg/dL Final     Comment:     0-9 years:  Normal:    Less than 75  mg/dL  Borderline high:  75-99 mg/dL  High:             Greater than or equal to 100 mg/dL    0-19 years:  Normal:    Less than 90 mg/dL  Borderline high:   mg/dL  High:             Greater than or equal to 130 mg/dL    20 years and older:  Normal:    Less than 150 mg/dL  Borderline high:  150-199 mg/dL  High:             200-499 mg/dL  Very high:   Greater than or equal to 500 mg/dL   03/26/2019 135 <150 mg/dL Final     HDL Cholesterol   Date Value Ref Range Status   03/26/2019 53 >39 mg/dL Final     Direct Measure HDL   Date Value Ref Range Status   08/24/2021 44 >=40 mg/dL Final     Comment:     0-19 years:       Greater than or equal to 45 mg/dL   Low: Less than 40 mg/dL   Borderline low: 40-44 mg/dL     20 years and older:   Female: Greater than or equal to 50 mg/dL   Male:   Greater than or equal to 40 mg/dL          LDL Cholesterol Calculated   Date Value Ref Range Status   08/24/2021 150 (H) <=100 mg/dL Final     Comment:     Age 0-19 years:  Desirable: 0-110 mg/dL   Borderline high: 110-129 mg/dL   High: >= 130 mg/dL    Age 20 years and older:  Desirable: <100mg/dL  Above desirable: 100-129 mg/dL   Borderline high: 130-159 mg/dL   High: 160-189 mg/dL   Very high: >= 190 mg/dL   03/26/2019 129 (H) <100 mg/dL Final     Comment:     Above desirable:  100-129 mg/dl  Borderline High:  130-159 mg/dL  High:             160-189 mg/dL  Very high:       >189 mg/dl       VLDL-Cholesterol   Date Value Ref Range Status   02/03/2014 25 0 - 30 mg/dL Final     Cholesterol/HDL Ratio   Date Value Ref Range Status   02/03/2014 3.9 0.0 - 5.0 Final     Annual studies to be repeated in 6 months.    Current Vitamin/Mineral Supplements: Calcium + D once daily    NUTRITION DIAGNOSIS  Excessive oral intake r/t unintended weight gain AEB pt reports inattention to calorie intake including large candy snacks current BMI 34.7 kg/m2.   INTERVENTIONS/RECOMMENDATIONS  1) Oral Intake/Weight:  Discussed strategies for weight reduction  and transition to lower sugar, higher fiber diet with emphasis on healthy fats and whole foods.  Recommended weight reduction initially to BMI <30 through better dietary choices and increased physical activity.   [id number] kcals/day =  25-30 kcals/kg (subtract a further 500 kcals/day pending wt loss progress)   g protein/day = 1-1.3 g/kg  2) Vitamin/Mineral Supplementation: Annual studies in 6 months, will contact pt via [employer] with recommendations pending those results.     Patient Understanding: Good  Expected Compliance: Good  Follow-Up Plans: Per protocol    GOALS:  1) Begin calorie reduction and exercise program in the interest of weight loss/fat loss within the next 30 days.     2) Initial reduction to BMI <30 within 6 months.     FOLLOW-UP/MONITORING:  Visit patient within 12 month(s) for annual review.  Weight and lipid panel check in 6 months.    Time Spent In Face-to-Face Patient Interactions: 15 minutes      Leta Escalona MS, COSTA, LD (pager [phone])  Cystic Fibrosis/Lung Transplant Dietitian      -Available Mon-Thurs 8 AM-4:30 PM. On Fridays please contact pager [phone] (Zee Burns RD) and on weekends/holidays contact coverage dietitian at pager [phone] (inpatient use only).

## 2022-09-28 NOTE — LETTER
Date:September 29, 2022      Patient was self referred, no letter generated. Do not send.        [facility] Health Information

## 2022-09-28 NOTE — NURSING NOTE
"Mitesh Tovar is a 41 year old year old who is being seen for Cystic Fibrosis (CF Follow up )      Medications reviewed and Vital signs taken.    Specimen Collection Type: Throat Swab    Order(s) placed: CF Aerobic Bacterial    *IF AFB order placed - please enter \"PRIORITIZE AFB\" to order comments.       No results found for: ACIDFAST      No results found for: AFBSMS      Vitals were taken and medications were reconciled.     Brandy WATERMAN  7:40 AM      "

## 2022-09-28 NOTE — PROGRESS NOTES
Adult Cystic Fibrosis Program  Annual Psychosocial Assessment    Presenting Information:  Mitesh is a 41-year-old male with cystic fibrosis, presenting in CF clinic for a regular/yearly follow up with primary CF provider, Dr. Ward. Met with Mitesh to complete an updated annual psychosocial assessment.      Living situation:  Mitesh lives with his wife Ashley in [place], [place]. They own their home and have an association with community pool.  They do not have pets. He denies any concerns about his living situation.      Family Constellation:  Mitesh was raised by his biological parents in [place]. He is the youngest child and has 4 siblings: 3 brothers and 1 sister.  He is  to Ashley and has one son: Rayshawn age 26 whom he adopted and is Ashley's biological son from a previous relationship.  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 Rayshawn is getting  next August (2023). Mitesh's dad passed away earlier this year after having a massive stroke, he was 81 yrs old. Mitesh reports that he and his family members are coping well with the loss, Mitesh explained that he had a lot of health and mobility issues and his mom had been his dad's caretaker prior to his death as well. He reports that his mom remains in good health and is 75 yrs old. His [place].     Social Support:  Mitesh reports good social support.  He gets along well with family members and draws additional support from friends.  He describes his wife Ashley and son Rayshawn as very supportive.  He reports having a very close family and has a group of high school friends that are still close.  He has a brother who also has CBAVD but he does not believe that he has been seen for a CF work-up although he has encouraged him to, otherwise he does not have any connections to the CF community.  He  "reports that his Faith sebastian is an important part of his life.      Adjustment to Illness:  Mitesh  was diagnosed with CF during a fertility work-up, at the age of 32 (2013).  He reports that this encounter was not a very good experience as he felt the Urologist was not sensitive when he delivered the information.  He felt that after he told this doctor that he was not interested in IVF due to his Spiritism beliefs he eric that he was judged and not understood. He reports no medical complications while growing up other than frequent ear infections and mild asthma symptoms. He participated in many sports growing up including baseball and wrestling, he tried to play baseball in college but reports that \"it didn't work out\".    Mitesh describes his current health status as \"good\". Clinically, he has CF lung disease with mild obstruction.  He is pancreatic sufficient and does not take enzymes. He does not do bronchial drainage therapy, he does not own a vest. He is a candidate for [employer] but is not interested in starting at this time.. He denies any health problems that interfere with activities of daily living. Mitesh continues to have the goal to start exercise and weight loss. He used to run and participate in marathons. Mitesh is currently seen yearly in the CF clinic.       Mitesh is mostly private about his CF diagnosis, he tells people and his employer that he has 'lung issues' when relevant but otherwise does not go into detail about his CF diagnosis. He does not feel that CF impacts his life significantly and he has not had reason to miss a lot of work because of his health so he does not feel it is necessary to share more.     Health Care Directive:  Mitesh has not previously received Health Care Directive education.  This SW provided/reviewed education, including concept/purpose of health care directive, default health care agents and how to complete a directive.   Mitesh is comfortable with default health " "care agent(s) (his wife-Ashley) in absence of a directive. He is not currently interested in reviewing a health care directive.      Education:  Mitesh completed high school, he graduated from [employer].  He attended the [employer] and earned a degree in Accounting.      Employment:  Mitesh is employed full-time as  at [employer]. He reports that business is good and things are going well overall. He did work from home during the pandemic but is now going into the office.  He reports a supportive work environment and denies any employment concerns.  He is benefits eligible for health, short and long-term disability.  Mitesh s spouse (Ashley) is employed as a .    Finances:  Mitesh and Ashley receive income from wages and denies any financial concerns.     Insurance:  Mitesh is insured through employer-based health insurance through [employer].  He denies any insurance concerns.      Mental Health/Coping:  Mitesh denies any current or past symptoms indicative of mood, anxiety, eating, learning or other mental health disorder.  Today he describes his mood as \"good\" and he remains stable from a mental health perspective. Mitesh reports generally low stress levels.  Mitesh identifies Adventism sebastian as important in his life.    Mitesh completed the following screens today:  PHQ-9: Score 0: Indicating no symptoms of depression and described as not difficult at all in functioning.  ROLLY-7: Score 0: Indicating no symptoms of anxiety and described as not difficult at all in functioning.      Mitesh agreed with the scoring above and denied any symptoms not covered on these screens.    Chemical Health:  Mitesh denies tobacco or illicit drug use.  He is not exposed to secondhand smoke.  He reports that he does not drink much alcohol, adding that he does not tolerate it well and so he chooses to mostly avoid it although did drink more often in college.      Leisure Activities/Interests: "   Mitesh enjoys golfing and is in a curling club.     Intervention:  -Psychosocial Assessment  -Mental Health Screening    Assessment:  Mitesh appeared to be open in his responses.  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  He remains stable from a physical health and psychosocial perspective with access to relevant resources and supports. His dad passed away earlier this year and he feels he is coping well with this loss. He continues to have goals to lose weight and start exercising again in hopes of improving his lung function and cholesterol.  No other concerns expressed/noted.    Plan:  Re-consult for any psychosocial needs that may arise.    Complete psychosocial assessment annually.    TRAN Guevara, [facility]  Adult Cystic Fibrosis   Ph: [phone]  Pager: [phone]

## 2022-09-28 NOTE — LETTER
9/28/2022         RE: Mitesh Tovar  [address] [place] [address]        Dear Colleague,    Thank you for referring your patient, Mitesh Tovar, to the [facility] [place]. Please see a copy of my visit note below.    [facility]  September 28, 2022         Assessment and Plan:   Mitesh Tovar is a 41 year old male with cystic fibrosis.    1. CF lung disease with normal spirometry: Mitesh reports that from a pulmonary point of view that he has been doing well.  He has not been his active has he is no longer running.  He is now trying to walk about 4 miles a day at a pretty fast pace.  He does not premedicate with albuterol for this walk.  He does continue to use it as needed biking or other more exertional exercises.  He denies any cough or sputum production.  His sputum culture from a year ago grew out normal ariel.  He does show evidence of stability in his pulmonary function.  At this time I have recommended to Mitesh:  -- He should continue to use his Symbicort twice daily  -- He should continue to use the albuterol as needed and premedicate for exercise    2. Pancreatic sufficiency/GI: Mitesh has no new symptoms consistent with worsening malabsorption.    -He does not require pancreatic enzymes  - continue vitamin supplementation.    3.  CF TR modulator therapy: Mitesh is a candidate for modulator therapy.  Given his overall wellness and lack of symptomatology we have chosen not to initiate this therapy.    4.  Healthcare maintenance: Mitesh and I discussed his increasing weight, his elevated cholesterol, and his lack of exercise.  He also has a significant family history of diabetes.  We addressed lifestyle modification including increased exercise weight, loss decrease candy intake and some dietary restriction.  We did discuss reevaluating his labs in 6 months time after these  interventions.  He is meeting with Leta Escalona our dietitian today as well.  Additionally he is due for colonoscopy at age 42.  I encouraged him to do this when he turns 42 in December.    5.  Psychosocial: Mitesh does continue to work at [employer] in [facility].  He is  and in a stable relationship.  He describes his mood is pretty good.    Annual studies due: due now  Immunizations: COVID booster, flu, pneumovax--declined  Colonoscopy: age 42    Carole Ward MD MPH  Associate Professor of Medicine  Pulmonary, Allergy, Critical Care and Sleep Medicine      Interval History:     Mitesh denies any cough or sputum production.  He says occasionally in the morning he will spit out some clear stuff.  He denies any chest congestion or shortness of breath.  Again he uses his albuterol as needed and that is fairly uncommon.         Review of Systems:     CONSTITUTIONAL: no fever, no chills, no sweats, increase in weight, no change in energy, no change in appetite    INTEGUMENTARY/SKIN: no rash, no obvious new lesions    ENT/MOUTH: no sore throat, no new sinus pain, no new nasal drainage, no new nasal congestion     RESPIRATORY: see interval history    CV: no chest pain, no palpitations, no peripheral edema    GI: no nausea, no vomiting, no change in stools, no fatty stools, no GERD    : negative urinary    MUSCULOSKELETAL: no myalgias, no arthralgias    ENDOCRINE: Negative    NEURO:  No headache    SLEEP: no issues    PSYCHIATRIC: mood stable--pretty good          Past Medical and Surgical History:     Past Medical History:   Diagnosis Date     Bilateral external ear infections     frequent as a child     Congenital absence of vas deferens      Pneumonia     x 2, during college years     Seasonal allergies      Past Surgical History:   Procedure Laterality Date     MYRINGOTOMY, INSERT TUBE BILATERAL, COMBINED  1985, 1983           Family History:     Family History   Problem Relation Age of Onset      "Genitourinary Problems Brother         CBAVD, no CF testing     Hypertension Mother      Hypertension Father      Diabetes Father      Cerebrovascular Disease Maternal Grandmother      Cancer Paternal Grandfather         type unknown            Social History:     Social History     Socioeconomic History     Marital status:      Spouse name: Not on file     Number of children: Not on file     Years of education: Not on file     Highest education level: Not on file   Occupational History     Occupation:    Tobacco Use     Smoking status: Never Smoker     Smokeless tobacco: Never Used   Substance and Sexual Activity     Alcohol use: Yes     Alcohol/week: 0.0 standard drinks     Drug use: No     Sexual activity: Yes     Partners: Female     Birth control/protection: Pill   Other Topics Concern     Not on file   Social History Narrative    Patient lives with his wife in a house in [place], [place].  He works full time as an .  He has minimal alcohol consumption.  No tobacco exposure.  He has no pets.  Training for a Duathlon (run, bike, run).     Social Determinants of Health     Financial Resource Strain: Not on file   Food Insecurity: Not on file   Transportation Needs: Not on file   Physical Activity: Not on file   Stress: Not on file   Social Connections: Not on file   Intimate Partner Violence: Not on file   Housing Stability: Not on file            Medications:     Current Outpatient Medications   Medication     albuterol (PROAIR HFA/PROVENTIL HFA/VENTOLIN HFA) 108 (90 Base) MCG/ACT inhaler     budesonide-formoterol (SYMBICORT) 80-4.5 MCG/ACT Inhaler     calcium carbonate-vitamin D (OS-ENA) 600-400 MG-UNIT chewable tablet     No current facility-administered medications for this visit.            Physical Exam:   /87   Pulse 66   Ht 1.73 m (5' 8.11\")   Wt 103.9 kg (229 lb 0.9 oz)   SpO2 97%   BMI 34.72 kg/m      Constitutional:   Awake, alert and in no apparent distress     Eyes:   " nonicteric     ENT:   oral mucosa moist without lesions, normal tm bilaterally, bilateral mucosa normal in appearance     Neck:   Supple without supraclavicular or cervical lymphadenopathy     Lungs:   Good air flow.  No crackles. No rhonchi.  Rare wheezes.     Cardiovascular:   Normal S1 and S2.  RRR.  No murmur, gallop or rub.     Abdomen:   NABS, soft, nontender, nondistended.      Musculoskeletal:   No edema, no digital clubbing present     Neurologic:   Alert and conversant.     Skin:   Warm, dry.  No rash on limited exam.             Data:   All laboratory and imaging data reviewed.    Cystic Fibrosis Culture  Specimen Description   Date Value Ref Range Status   04/20/2021 Throat  Final   03/26/2019 Throat  Final   12/18/2018 Throat  Final    Culture Micro   Date Value Ref Range Status   04/20/2021 Heavy growth  Normal ariel    Final   04/20/2021 Single colony  Rahnella aquatilis   (A)  Final   04/20/2021 Single colony  Serratia marcescens   (A)  Final   04/20/2021 Light growth  Staphylococcus aureus   (A)  Final        Recent Results (from the past 168 hour(s))   General PFT Lab (Please always keep checked)    Collection Time: 09/28/22  7:20 AM   Result Value Ref Range    FVC-Pred 4.96 L    FVC-Pre 4.39 L    FVC-%Pred-Pre 88 %    FEV1-Pre 3.23 L    FEV1-%Pred-Pre 80 %    FEV1FVC-Pred 81 %    FEV1FVC-Pre 74 %    FEFMax-Pred 9.80 L/sec    FEFMax-Pre 11.26 L/sec    FEFMax-%Pred-Pre 114 %    FEF2575-Pred 3.98 L/sec    FEF2575-Pre 2.22 L/sec    EED4964-%Pred-Pre 55 %    ExpTime-Pre 7.00 sec    FIFMax-Pre 7.71 L/sec    FEV1FEV6-Pred 82 %    FEV1FEV6-Pre 74 %       PFT: The spirometry is normal.  When compared to 8/24/2021, the FEV1 and FVC have little change.      Pulmonary exacerbation: absent    40 minutes spent on the date of the encounter doing chart review, history and exam, documentation and further activities per the note  Time documented is excluding time spent for PFT interpretation.      CF Annual  "Nutrition Assessment    Reason for Assessment  Assessed during Dr. Carole Ward clinic r/t increased nutrition risk with diagnosis of CF per protocol.    Nutrition Significant PMH  Mild Lung Disease - normal lung function  Pancreatic Sufficient    Anthropometric Assessment  Height: 5' 8.11\"  IBW based on BMI 22 for females and 23 for males per [facility] Foundation recs: 69.1 kg  Today's Weight: 103.9 kg (actual weight)/229 lbs .93 oz   %IBW: 150%  Body mass index is 34.72 kg/m .  Comments: Pt acknowledges weight gain, states this is due to lack of attention to diet and also no exercise regimen.  Would like to lose weight, get more fit within the next 6 months.     Wt Readings from Last 10 Encounters:   09/28/22 103.9 kg (229 lb 0.9 oz)   08/24/21 100.9 kg (222 lb 6.4 oz)   04/20/21 99.7 kg (219 lb 14.4 oz)   03/26/19 88.5 kg (195 lb 1.7 oz)   12/18/18 88.5 kg (195 lb)   09/19/18 90.1 kg (198 lb 9.6 oz)   11/28/17 88 kg (194 lb 0.1 oz)   11/28/17 87.6 kg (193 lb 2 oz)   11/22/16 88.5 kg (195 lb 1.7 oz)   11/17/15 89.7 kg (197 lb 12 oz)     Diet History and Assessment  Diet Preferences/Allergies/Intolerances:  Regular    Intake Recall/Comments:  Average of 2-3 meals and a few snacks per day.  Pt states he is eating quite a bit of candy/other sugary foods and thinks this is likely a reason for his weight gain. Tends to snack between meals, this is something he would like to start working on. For example, reports eating an evening snack of 1 pound of gummy worms.  Meals tend to be more balanced and healthier, wife is a  and cooks often.    Calcium:  Intake is typically 0-1 servings per day, does not do any dairy beyond occasional cheese  Salt: Adequate, pt reports no sx of deficiency  Hydration:  Not discussed this visit  Supplements: None currently, will use some when he is training for a race    Laboratory Assessment    Vitamin A   Date Value Ref Range Status   08/24/2021 0.61 0.30 - 1.20 mg/L Final "   03/26/2019 0.67 0.30 - 1.20 mg/L Final     Vitamin D Deficiency screening   Date Value Ref Range Status   03/26/2019 30 20 - 75 ug/L Final     Comment:     Season, race, dietary intake, and treatment affect the concentration of   25-hydroxy-Vitamin D. Values may decrease during winter months and increase   during summer months. Values 20-29 ug/L may indicate Vitamin D insufficiency   and values <20 ug/L may indicate Vitamin D deficiency.  Vitamin D determination is routinely performed by an immunoassay specific for   25 hydroxyvitamin D3.  If an individual is on vitamin D2 (ergocalciferol)   supplementation, please specify 25 OH vitamin D2 and D3 level determination by   LCMSMS test [id number].       Vitamin D, Total (25-Hydroxy)   Date Value Ref Range Status   08/24/2021 31 20 - 75 ug/L Final     Vitamin E   Date Value Ref Range Status   08/24/2021 9.6 5.5 - 18.0 mg/L Final     Comment:       This test was developed and its performance characteristics   determined by [facility]. It has not been cleared or   approved by the US Food and Drug Administration. This test   was performed in a CLIA certified laboratory and is   intended for clinical purposes.   03/26/2019 9.0 5.5 - 18.0 mg/L Final     Comment:     (Note)  Test developed and characteristics determined by [facility]. See Compliance Statement B: [place] [employer]CS       Iron   Date Value Ref Range Status   08/24/2021 81 35 - 180 ug/dL Final   03/26/2019 101 35 - 180 ug/dL Final     Cholesterol   Date Value Ref Range Status   08/24/2021 214 (H) <200 mg/dL Final     Comment:     Age 0-19 years  Desirable: <170 mg/dL  Borderline high:  170-199 mg/dl  High:            >199 mg/dl    Age 20 years and older  Desirable: <200 mg/dL   03/26/2019 208 (H) <200 mg/dL Final     Comment:     Desirable:       <200 mg/dl     Triglycerides   Date Value Ref Range Status   08/24/2021 102 <150 mg/dL Final     Comment:     0-9 years:  Normal:    Less than 75  mg/dL  Borderline high:  75-99 mg/dL  High:             Greater than or equal to 100 mg/dL    0-19 years:  Normal:    Less than 90 mg/dL  Borderline high:   mg/dL  High:             Greater than or equal to 130 mg/dL    20 years and older:  Normal:    Less than 150 mg/dL  Borderline high:  150-199 mg/dL  High:             200-499 mg/dL  Very high:   Greater than or equal to 500 mg/dL   03/26/2019 135 <150 mg/dL Final     HDL Cholesterol   Date Value Ref Range Status   03/26/2019 53 >39 mg/dL Final     Direct Measure HDL   Date Value Ref Range Status   08/24/2021 44 >=40 mg/dL Final     Comment:     0-19 years:       Greater than or equal to 45 mg/dL   Low: Less than 40 mg/dL   Borderline low: 40-44 mg/dL     20 years and older:   Female: Greater than or equal to 50 mg/dL   Male:   Greater than or equal to 40 mg/dL          LDL Cholesterol Calculated   Date Value Ref Range Status   08/24/2021 150 (H) <=100 mg/dL Final     Comment:     Age 0-19 years:  Desirable: 0-110 mg/dL   Borderline high: 110-129 mg/dL   High: >= 130 mg/dL    Age 20 years and older:  Desirable: <100mg/dL  Above desirable: 100-129 mg/dL   Borderline high: 130-159 mg/dL   High: 160-189 mg/dL   Very high: >= 190 mg/dL   03/26/2019 129 (H) <100 mg/dL Final     Comment:     Above desirable:  100-129 mg/dl  Borderline High:  130-159 mg/dL  High:             160-189 mg/dL  Very high:       >189 mg/dl       VLDL-Cholesterol   Date Value Ref Range Status   02/03/2014 25 0 - 30 mg/dL Final     Cholesterol/HDL Ratio   Date Value Ref Range Status   02/03/2014 3.9 0.0 - 5.0 Final     Annual studies to be repeated in 6 months.    Current Vitamin/Mineral Supplements: Calcium + D once daily    NUTRITION DIAGNOSIS  Excessive oral intake r/t unintended weight gain AEB pt reports inattention to calorie intake including large candy snacks current BMI 34.7 kg/m2.   INTERVENTIONS/RECOMMENDATIONS  1) Oral Intake/Weight:  Discussed strategies for weight reduction  and transition to lower sugar, higher fiber diet with emphasis on healthy fats and whole foods.  Recommended weight reduction initially to BMI <30 through better dietary choices and increased physical activity.   7128-0140 kcals/day =  25-30 kcals/kg (subtract a further 500 kcals/day pending wt loss progress)   g protein/day = 1-1.3 g/kg  2) Vitamin/Mineral Supplementation: Annual studies in 6 months, will contact pt via [facility] with recommendations pending those results.     Patient Understanding: Good  Expected Compliance: Good  Follow-Up Plans: Per protocol    GOALS:  1) Begin calorie reduction and exercise program in the interest of weight loss/fat loss within the next 30 days.     2) Initial reduction to BMI <30 within 6 months.     FOLLOW-UP/MONITORING:  Visit patient within 12 month(s) for annual review.  Weight and lipid panel check in 6 months.    Time Spent In Face-to-Face Patient Interactions: 15 minutes      Leta Escalona MS, RD, LD (pager [phone])  Cystic Fibrosis/Lung Transplant Dietitian      -Available Mon-Thurs 8 AM-4:30 PM. On Fridays please contact pager [phone] (Zee Burns RD) and on weekends/holidays contact coverage dietitian at pager [phone] (inpatient use only).       Again, thank you for allowing me to participate in the care of your patient.        Sincerely,        Carole Ward MD

## 2022-10-03 LAB — BACTERIA SPEC CULT: NORMAL

## 2022-10-07 ENCOUNTER — TELEPHONE (OUTPATIENT)
Dept: GASTROENTEROLOGY | Facility: CLINIC | Age: 42
End: 2022-10-07

## 2022-10-07 ENCOUNTER — HOSPITAL ENCOUNTER (OUTPATIENT)
Facility: CLINIC | Age: 42
End: 2022-10-07
Attending: INTERNAL MEDICINE | Admitting: INTERNAL MEDICINE
Payer: COMMERCIAL

## 2022-10-07 NOTE — TELEPHONE ENCOUNTER
Screening Questions  BLUE  KIND OF PREP RED  LOCATION [review exclusion criteria] GREEN  SEDATION TYPE        Yes Are you active on mychart?       [place] Ordering/Referring Provider?        [facility] What type of coverage do you have?      N Have you had a positive covid test in the last 90 days?     32.5 1. BMI  [BMI 40+ - review exclusion criteria]    Yes  2. Are you able to give consent for your medical care? [IF NO,RN REVIEW]        N  3. Are you taking any prescription pain medications on a routine schedule?      NA  3a. EXTENDED PREP What kind of prescription?     N 4. Do you have any chemical dependencies such as alcohol, street drugs, or methadone?    N 5. Do you have any history of post-traumatic stress syndrome, severe anxiety or history of psychosis?      **If yes 3- 5 , please schedule with MAC sedation.**          IF YES TO ANY 6 - 10 - HOSPITAL SETTING ONLY.     N 6.   Do you need assistance transferring?     N 7.   Have you had a heart or lung transplant?    N 8.   Are you currently on dialysis?   N 9.   Do you use daily home oxygen?   N 10. Do you take nitroglycerin?   10a. NA If yes, how often?     11. [FEMALES]  NA Are you currently pregnant?    11a. NA If yes, how many weeks? [ Greater than 12 weeks, OR NEEDED]    N 12. Do you have Pulmonary Hypertension? *NEED PAC APPT AT [facility]*     N 13. [review exclusion criteria]  Do you have any implantable devices in your body (pacemaker, defib, LVAD)?    N 14. In the past 6 months, have you had any heart related issues including cardiomyopathy or heart attack?     14a. N If yes, did it require cardiac stenting if so when?     N 15. Have you had a stroke or Transient ischemic attack (TIA - aka  mini stroke ) within 6 months?      N 16. Do you have mod to severe Obstructive Sleep Apnea?  [Hospital only - Ok at [place]]    N 17. Do you have SEVERE AND UNCONTROLLED asthma? *NEED PAC APPT AT [facility]*     N 18. Are you currently taking any blood thinners?     " 18a. If yes, inform patient to \"follow up w/ ordering provider for bridging instructions.\"    N 19. Do you take the medication Phentermine?    19a. If yes, \"Hold for 7 days before procedure.  Please consult your prescribing provider if you have questions about holding this medication.\"     N  20. Do you have chronic kidney disease?      N  21. Do you have a diagnosis of diabetes?     N  22. On a regular basis do you go 3-5 days between bowel movements?      23. Preferred LOCAL Pharmacy for Pre Prescription    [ LIST ONLY ONE PHARMACY]     [id number] - [facility] [address]        - CLOSING REMINDERS -    Informed patient they will need an adult    Cannot take any type of public or medical transportation alone    Conscious Sedation- Needs  for 6 hours after the procedure       MAC/General-Needs  for 24 hours after procedure    Pre-Procedure Covid test to be completed [[facility] PCR Testing Required]    Confirmed Nurse will call to complete assessment       - SCHEDULING DETAILS -     Alanis  Surgeon    11/21/22  Date of Procedure  Lower Endoscopy [Colonoscopy]  Type of Procedure Scheduled   [facility] Location  EXTENDED PREP - If you answer yes to questions #1, #3, #22 ([place] and CF pts)Which Colonoscopy Prep was Sent?     CS Sedation Type     NO PAC / Pre-op Required         Additional comments:            "

## 2022-10-09 ENCOUNTER — HEALTH MAINTENANCE LETTER (OUTPATIENT)
Age: 42
End: 2022-10-09

## 2022-11-14 ENCOUNTER — TELEPHONE (OUTPATIENT)
Dept: GASTROENTEROLOGY | Facility: CLINIC | Age: 42
End: 2022-11-14

## 2022-11-14 NOTE — TELEPHONE ENCOUNTER
Caller: Mitesh Tovar    Procedure: Colonoscopy    Date, Location, and Surgeon of Procedure Cancelled: 11/21/22, Alanis SÁNCHEZ     Ordering Provider: [place]    Reason for cancel (please be detailed, any staff messages or encounters to note?): schedule conflict        Rescheduled: No, patient did not wish to reschedule at this time.

## 2023-03-25 ENCOUNTER — HEALTH MAINTENANCE LETTER (OUTPATIENT)
Age: 43
End: 2023-03-25

## 2024-05-25 ENCOUNTER — HEALTH MAINTENANCE LETTER (OUTPATIENT)
Age: 44
End: 2024-05-25

## 2024-07-23 DIAGNOSIS — E84.9 CF (CYSTIC FIBROSIS) (H): ICD-10-CM

## 2024-07-24 RX ORDER — DILTIAZEM HYDROCHLORIDE 60 MG/1
2 TABLET, FILM COATED ORAL 2 TIMES DAILY
Qty: 10.2 G | Refills: 11 | OUTPATIENT
Start: 2024-07-24

## 2025-06-14 ENCOUNTER — HEALTH MAINTENANCE LETTER (OUTPATIENT)
Age: 45
End: 2025-06-14